# Patient Record
Sex: FEMALE | Race: WHITE | NOT HISPANIC OR LATINO
[De-identification: names, ages, dates, MRNs, and addresses within clinical notes are randomized per-mention and may not be internally consistent; named-entity substitution may affect disease eponyms.]

---

## 2017-03-18 ENCOUNTER — TRANSCRIPTION ENCOUNTER (OUTPATIENT)
Age: 72
End: 2017-03-18

## 2017-03-29 ENCOUNTER — TRANSCRIPTION ENCOUNTER (OUTPATIENT)
Age: 72
End: 2017-03-29

## 2017-08-13 ENCOUNTER — TRANSCRIPTION ENCOUNTER (OUTPATIENT)
Age: 72
End: 2017-08-13

## 2018-02-16 ENCOUNTER — TRANSCRIPTION ENCOUNTER (OUTPATIENT)
Age: 73
End: 2018-02-16

## 2022-07-08 ENCOUNTER — NON-APPOINTMENT (OUTPATIENT)
Age: 77
End: 2022-07-08

## 2022-07-12 ENCOUNTER — NON-APPOINTMENT (OUTPATIENT)
Age: 77
End: 2022-07-12

## 2022-07-19 ENCOUNTER — NON-APPOINTMENT (OUTPATIENT)
Age: 77
End: 2022-07-19

## 2022-09-19 ENCOUNTER — NON-APPOINTMENT (OUTPATIENT)
Age: 77
End: 2022-09-19

## 2022-11-29 ENCOUNTER — NON-APPOINTMENT (OUTPATIENT)
Age: 77
End: 2022-11-29

## 2022-12-30 ENCOUNTER — NON-APPOINTMENT (OUTPATIENT)
Age: 77
End: 2022-12-30

## 2023-06-19 ENCOUNTER — NON-APPOINTMENT (OUTPATIENT)
Age: 78
End: 2023-06-19

## 2023-08-07 ENCOUNTER — INPATIENT (INPATIENT)
Facility: HOSPITAL | Age: 78
LOS: 2 days | Discharge: HOME | DRG: 280 | End: 2023-08-10
Attending: STUDENT IN AN ORGANIZED HEALTH CARE EDUCATION/TRAINING PROGRAM | Admitting: HOSPITALIST
Payer: MEDICARE

## 2023-08-07 VITALS
HEART RATE: 64 BPM | SYSTOLIC BLOOD PRESSURE: 136 MMHG | OXYGEN SATURATION: 95 % | HEIGHT: 60 IN | WEIGHT: 125 LBS | DIASTOLIC BLOOD PRESSURE: 91 MMHG | RESPIRATION RATE: 18 BRPM

## 2023-08-07 DIAGNOSIS — Z96.649 PRESENCE OF UNSPECIFIED ARTIFICIAL HIP JOINT: Chronic | ICD-10-CM

## 2023-08-07 LAB
ANION GAP SERPL CALC-SCNC: 14 MMOL/L — SIGNIFICANT CHANGE UP (ref 5–17)
ANISOCYTOSIS BLD QL: SLIGHT — SIGNIFICANT CHANGE UP
APTT BLD: 32.1 SEC — SIGNIFICANT CHANGE UP (ref 24.5–35.6)
BASOPHILS # BLD AUTO: 0 K/UL — SIGNIFICANT CHANGE UP (ref 0–0.2)
BASOPHILS NFR BLD AUTO: 0 % — SIGNIFICANT CHANGE UP (ref 0–2)
BUN SERPL-MCNC: 16 MG/DL — SIGNIFICANT CHANGE UP (ref 7–23)
BURR CELLS BLD QL SMEAR: PRESENT — SIGNIFICANT CHANGE UP
CALCIUM SERPL-MCNC: 9.8 MG/DL — SIGNIFICANT CHANGE UP (ref 8.4–10.5)
CHLORIDE SERPL-SCNC: 104 MMOL/L — SIGNIFICANT CHANGE UP (ref 96–108)
CO2 SERPL-SCNC: 23 MMOL/L — SIGNIFICANT CHANGE UP (ref 22–31)
CREAT SERPL-MCNC: 0.72 MG/DL — SIGNIFICANT CHANGE UP (ref 0.5–1.3)
DACRYOCYTES BLD QL SMEAR: SLIGHT — SIGNIFICANT CHANGE UP
EGFR: 87 ML/MIN/1.73M2 — SIGNIFICANT CHANGE UP
ELLIPTOCYTES BLD QL SMEAR: SLIGHT — SIGNIFICANT CHANGE UP
EOSINOPHIL # BLD AUTO: 0.09 K/UL — SIGNIFICANT CHANGE UP (ref 0–0.5)
EOSINOPHIL NFR BLD AUTO: 0.9 % — SIGNIFICANT CHANGE UP (ref 0–6)
GIANT PLATELETS BLD QL SMEAR: PRESENT — SIGNIFICANT CHANGE UP
GLUCOSE SERPL-MCNC: 124 MG/DL — HIGH (ref 70–99)
HCT VFR BLD CALC: 44.3 % — SIGNIFICANT CHANGE UP (ref 34.5–45)
HGB BLD-MCNC: 14.4 G/DL — SIGNIFICANT CHANGE UP (ref 11.5–15.5)
INR BLD: 0.95 — SIGNIFICANT CHANGE UP (ref 0.85–1.18)
ISTAT ACTK (ACTIVATED CLOTTING TIME KAOLIN): 209 SEC — HIGH (ref 74–137)
LYMPHOCYTES # BLD AUTO: 4.53 K/UL — HIGH (ref 1–3.3)
LYMPHOCYTES # BLD AUTO: 44.5 % — HIGH (ref 13–44)
MANUAL SMEAR VERIFICATION: SIGNIFICANT CHANGE UP
MCHC RBC-ENTMCNC: 30.4 PG — SIGNIFICANT CHANGE UP (ref 27–34)
MCHC RBC-ENTMCNC: 32.5 GM/DL — SIGNIFICANT CHANGE UP (ref 32–36)
MCV RBC AUTO: 93.7 FL — SIGNIFICANT CHANGE UP (ref 80–100)
MICROCYTES BLD QL: SLIGHT — SIGNIFICANT CHANGE UP
MONOCYTES # BLD AUTO: 0.93 K/UL — HIGH (ref 0–0.9)
MONOCYTES NFR BLD AUTO: 9.1 % — SIGNIFICANT CHANGE UP (ref 2–14)
NEUTROPHILS # BLD AUTO: 4.54 K/UL — SIGNIFICANT CHANGE UP (ref 1.8–7.4)
NEUTROPHILS NFR BLD AUTO: 44.6 % — SIGNIFICANT CHANGE UP (ref 43–77)
NT-PROBNP SERPL-SCNC: 141 PG/ML — SIGNIFICANT CHANGE UP (ref 0–300)
OVALOCYTES BLD QL SMEAR: SLIGHT — SIGNIFICANT CHANGE UP
PLAT MORPH BLD: ABNORMAL
PLATELET # BLD AUTO: 322 K/UL — SIGNIFICANT CHANGE UP (ref 150–400)
POIKILOCYTOSIS BLD QL AUTO: SLIGHT — SIGNIFICANT CHANGE UP
POLYCHROMASIA BLD QL SMEAR: SLIGHT — SIGNIFICANT CHANGE UP
POTASSIUM SERPL-MCNC: 4.6 MMOL/L — SIGNIFICANT CHANGE UP (ref 3.5–5.3)
POTASSIUM SERPL-SCNC: 4.6 MMOL/L — SIGNIFICANT CHANGE UP (ref 3.5–5.3)
PROTHROM AB SERPL-ACNC: 10.9 SEC — SIGNIFICANT CHANGE UP (ref 9.5–13)
RBC # BLD: 4.73 M/UL — SIGNIFICANT CHANGE UP (ref 3.8–5.2)
RBC # FLD: 13.4 % — SIGNIFICANT CHANGE UP (ref 10.3–14.5)
RBC BLD AUTO: ABNORMAL
SMUDGE CELLS # BLD: PRESENT — SIGNIFICANT CHANGE UP
SODIUM SERPL-SCNC: 141 MMOL/L — SIGNIFICANT CHANGE UP (ref 135–145)
TROPONIN T, HIGH SENSITIVITY RESULT: 177 NG/L — CRITICAL HIGH (ref 0–51)
VARIANT LYMPHS # BLD: 0.9 % — SIGNIFICANT CHANGE UP (ref 0–6)
WBC # BLD: 10.19 K/UL — SIGNIFICANT CHANGE UP (ref 3.8–10.5)
WBC # FLD AUTO: 10.19 K/UL — SIGNIFICANT CHANGE UP (ref 3.8–10.5)

## 2023-08-07 PROCEDURE — 99285 EMERGENCY DEPT VISIT HI MDM: CPT

## 2023-08-07 PROCEDURE — 71045 X-RAY EXAM CHEST 1 VIEW: CPT | Mod: 26

## 2023-08-07 PROCEDURE — 99223 1ST HOSP IP/OBS HIGH 75: CPT

## 2023-08-07 PROCEDURE — 93458 L HRT ARTERY/VENTRICLE ANGIO: CPT | Mod: 26

## 2023-08-07 PROCEDURE — 99152 MOD SED SAME PHYS/QHP 5/>YRS: CPT

## 2023-08-07 RX ORDER — ONDANSETRON 8 MG/1
4 TABLET, FILM COATED ORAL EVERY 8 HOURS
Refills: 0 | Status: DISCONTINUED | OUTPATIENT
Start: 2023-08-07 | End: 2023-08-10

## 2023-08-07 RX ORDER — MORPHINE SULFATE 50 MG/1
4 CAPSULE, EXTENDED RELEASE ORAL ONCE
Refills: 0 | Status: DISCONTINUED | OUTPATIENT
Start: 2023-08-07 | End: 2023-08-07

## 2023-08-07 RX ORDER — LANOLIN ALCOHOL/MO/W.PET/CERES
3 CREAM (GRAM) TOPICAL AT BEDTIME
Refills: 0 | Status: DISCONTINUED | OUTPATIENT
Start: 2023-08-07 | End: 2023-08-10

## 2023-08-07 RX ORDER — HEPARIN SODIUM 5000 [USP'U]/ML
3500 INJECTION INTRAVENOUS; SUBCUTANEOUS EVERY 6 HOURS
Refills: 0 | Status: DISCONTINUED | OUTPATIENT
Start: 2023-08-07 | End: 2023-08-07

## 2023-08-07 RX ORDER — GABAPENTIN 400 MG/1
1 CAPSULE ORAL
Refills: 0 | DISCHARGE

## 2023-08-07 RX ORDER — NITROGLYCERIN 6.5 MG
20 CAPSULE, EXTENDED RELEASE ORAL
Qty: 50 | Refills: 0 | Status: DISCONTINUED | OUTPATIENT
Start: 2023-08-07 | End: 2023-08-07

## 2023-08-07 RX ORDER — CLOPIDOGREL BISULFATE 75 MG/1
600 TABLET, FILM COATED ORAL ONCE
Refills: 0 | Status: COMPLETED | OUTPATIENT
Start: 2023-08-07 | End: 2023-08-07

## 2023-08-07 RX ORDER — NITROGLYCERIN 6.5 MG
0.4 CAPSULE, EXTENDED RELEASE ORAL
Refills: 0 | Status: DISCONTINUED | OUTPATIENT
Start: 2023-08-07 | End: 2023-08-07

## 2023-08-07 RX ORDER — HEPARIN SODIUM 5000 [USP'U]/ML
INJECTION INTRAVENOUS; SUBCUTANEOUS
Qty: 25000 | Refills: 0 | Status: DISCONTINUED | OUTPATIENT
Start: 2023-08-07 | End: 2023-08-07

## 2023-08-07 RX ORDER — HEPARIN SODIUM 5000 [USP'U]/ML
3500 INJECTION INTRAVENOUS; SUBCUTANEOUS ONCE
Refills: 0 | Status: COMPLETED | OUTPATIENT
Start: 2023-08-07 | End: 2023-08-07

## 2023-08-07 RX ORDER — ACETAMINOPHEN 500 MG
650 TABLET ORAL EVERY 6 HOURS
Refills: 0 | Status: DISCONTINUED | OUTPATIENT
Start: 2023-08-07 | End: 2023-08-08

## 2023-08-07 RX ADMIN — CLOPIDOGREL BISULFATE 600 MILLIGRAM(S): 75 TABLET, FILM COATED ORAL at 19:57

## 2023-08-07 RX ADMIN — HEPARIN SODIUM 700 UNIT(S)/HR: 5000 INJECTION INTRAVENOUS; SUBCUTANEOUS at 19:57

## 2023-08-07 RX ADMIN — MORPHINE SULFATE 4 MILLIGRAM(S): 50 CAPSULE, EXTENDED RELEASE ORAL at 19:19

## 2023-08-07 RX ADMIN — Medication 0.4 MILLIGRAM(S): at 18:49

## 2023-08-07 RX ADMIN — MORPHINE SULFATE 4 MILLIGRAM(S): 50 CAPSULE, EXTENDED RELEASE ORAL at 22:15

## 2023-08-07 RX ADMIN — HEPARIN SODIUM 3500 UNIT(S): 5000 INJECTION INTRAVENOUS; SUBCUTANEOUS at 19:57

## 2023-08-07 RX ADMIN — Medication 6 MICROGRAM(S)/MIN: at 20:13

## 2023-08-07 RX ADMIN — MORPHINE SULFATE 4 MILLIGRAM(S): 50 CAPSULE, EXTENDED RELEASE ORAL at 21:59

## 2023-08-07 RX ADMIN — MORPHINE SULFATE 4 MILLIGRAM(S): 50 CAPSULE, EXTENDED RELEASE ORAL at 19:35

## 2023-08-07 NOTE — ED ADULT NURSE NOTE - NSFALLHARMRISKINTERV_ED_ALL_ED

## 2023-08-07 NOTE — ED ADULT NURSE REASSESSMENT NOTE - NS ED NURSE REASSESS COMMENT FT1
cardiology at bedside. pt continues to c/o "crushing" CP and pain to upper back and neck. pt pending repeat EKG.

## 2023-08-07 NOTE — ED ADULT NURSE NOTE - OBJECTIVE STATEMENT
78F no known prior medical hx presents c/o sudden onset of severe "squeezing" substernal CP radiating to her neck, arms and mid back that started around 530pm. pt reports pain came on at rest. pt received 324 ASA and 1 spray of nitro en route. pt seen as UPGRADE with Dr. Mitchell at bedside. EKG completed and patient placed on continuous cardiac monitor. denies SOB/palpitations, n/v/d, fevers/chills, cough/congestion or weakness/dizziness. pt speaking in clear, complete sentences. RR easy, even, un-labored on room air. 18g peripheral IV placed to left forearm.

## 2023-08-07 NOTE — ED PROVIDER NOTE - PHYSICAL EXAMINATION
CONST: nontoxic NAD uncomfortable 2/2 pain  HEAD: atraumatic  EYES: conjunctivae clear, EOMI  ENT: mmm  NECK: supple  CARD: rrr   CHEST: ctab no r/r/w, no stridor/retractions/tripoding  ABD: soft, nd, nttp, no rebound/guarding  EXT: FROM, symmetric distal pulses intact  SKIN: warm, dry, no rash, no pedal edema  NEURO: awake alert answering questions following commands moving all extremities

## 2023-08-07 NOTE — ED PROVIDER NOTE - CLINICAL SUMMARY MEDICAL DECISION MAKING FREE TEXT BOX
In the ED patient with normal vitals, uncomfortable appearing, complaining of active chest pain.  Hx consistent w/ most likely ACS, low suspicion for PTX. Not consistent w/ PE or AD.  EKG done immediately-no STEMI, however had does have ST depressions in ii, iii, avf.  Called cardiology fellow to come evaluate patient for unstable angina with active chest pain and EKG changes.  Patient was evaluated in the ED, received sublingual nitro and morphine with some improvement but not complete resolution of symptoms.  Troponin 177, cardiology fellow recommends Plavix, heparin, admission for cardiac catheterization In the ED patient with normal vitals, uncomfortable appearing, complaining of active chest pain.  Hx consistent w/ most likely ACS, low suspicion for PTX. Not consistent w/ PE or AD.  EKG done immediately-no STEMI, however had does have ST depressions in ii, iii, avf.  Called cardiology fellow to come evaluate patient for unstable angina with active chest pain and EKG changes.  Patient was evaluated in the ED, received sublingual nitro and morphine with some improvement but not complete resolution of symptoms.  Troponin 177, cardiology fellow recommends Plavix, heparin, admission for urgent cardiac catheterization In the ED patient with normal vitals, uncomfortable appearing, complaining of active chest pain.  Hx consistent w/ most likely ACS, low suspicion for PTX. Not consistent w/ PE or AD.  EKG done immediately-no STEMI, however does have ST depressions in ii, iii, avf.  Called cardiology fellow to come evaluate patient for unstable angina with active chest pain and EKG changes.  Patient was evaluated in the ED, received sublingual nitro and morphine with some improvement but not complete resolution of symptoms.  Troponin 177, cardiology fellow recommends Plavix, heparin, admission for urgent cardiac catheterization
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
## 2023-08-07 NOTE — PATIENT PROFILE ADULT - BILL PAYMENT - CHOOSE ALL APPLY
[facility] EMERGENCY DEPARTMENT  [address]  [place] [address]  Phone: [phone]    August 29, 2017        Ashleigh Linares  [address]  [facility] [address]          To whom it may concern:    RE: Ashleigh Sotelo was evaluated in the emergency department with fever. Please excuse mother from this evening's requirements.      Sincerely,        Roseanna Ulloa MD   electricity/gas/medical bills/phone/cell phone

## 2023-08-07 NOTE — H&P ADULT - NSHPPHYSICALEXAM_GEN_ALL_CORE
.  VITAL SIGNS:  T(C): 36.6 (08-07-23 @ 19:11), Max: 36.6 (08-07-23 @ 19:11)  T(F): 97.8 (08-07-23 @ 19:11), Max: 97.8 (08-07-23 @ 19:11)  HR: 69 (08-07-23 @ 20:00) (62 - 72)  BP: 155/105 (08-07-23 @ 20:00) (136/91 - 167/98)  BP(mean): --  RR: 18 (08-07-23 @ 20:00) (17 - 18)  SpO2: 98% (08-07-23 @ 20:00) (91% - 98%)  Wt(kg): --    PHYSICAL EXAM:    Constitutional: nontoxic NAD uncomfortable 2/2 pain  Head: NC/AT  Eyes: PERRL, EOMI, anicteric sclera  ENT: no nasal discharge; uvula midline, no oropharyngeal erythema or exudates; MMM  Neck: supple; no JVD or thyromegaly  Respiratory: CTA B/L; no W/R/R, no retractions  Cardiac: +S1/S2; RRR; no M/R/G; PMI non-displaced  Gastrointestinal: abdomen soft, NT/ND; no rebound or guarding; +BSx4  Extremities: WWP, no clubbing or cyanosis; no peripheral edema  Vascular: bandage overlying right fem artery-nonfluctuant, non tender, +1DP/PT pulses B/L  Neurologic: AAOx3; CNII-XII grossly intact; no focal deficits .  VITAL SIGNS:  T(C): 36.6 (08-07-23 @ 19:11), Max: 36.6 (08-07-23 @ 19:11)  T(F): 97.8 (08-07-23 @ 19:11), Max: 97.8 (08-07-23 @ 19:11)  HR: 69 (08-07-23 @ 20:00) (62 - 72)  BP: 155/105 (08-07-23 @ 20:00) (136/91 - 167/98)  BP(mean): --  RR: 18 (08-07-23 @ 20:00) (17 - 18)  SpO2: 98% (08-07-23 @ 20:00) (91% - 98%)  Wt(kg): --    PHYSICAL EXAM:    Constitutional: nontoxic, mild distress 2/2 pain  Head: NC/AT  Eyes: PERRL, EOMI, anicteric sclera  ENT: no nasal discharge; uvula midline, no oropharyngeal erythema or exudates; MMM  Neck: supple; no JVD or thyromegaly  Respiratory: CTA B/L; no W/R/R, no retractions  Cardiac: +S1/S2; RRR; no M/R/G; PMI non-displaced  Gastrointestinal: abdomen soft, NT/ND; no rebound or guarding; +BSx4  Extremities: WWP, no clubbing or cyanosis; no peripheral edema  Vascular: bandage overlying right fem artery-nonfluctuant, non tender, +1DP/PT pulses B/L  Neurologic: AAOx3; CNII-XII grossly intact; no focal deficits

## 2023-08-07 NOTE — H&P ADULT - NSHPLABSRESULTS_GEN_ALL_CORE
14.4   10.19 )-----------( 322      ( 07 Aug 2023 18:40 )             44.3       08-07    141  |  104  |  16  ----------------------------<  124<H>  4.6   |  23  |  0.72    Ca    9.8      07 Aug 2023 18:40                Urinalysis Basic - ( 07 Aug 2023 18:40 )    Color: x / Appearance: x / SG: x / pH: x  Gluc: 124 mg/dL / Ketone: x  / Bili: x / Urobili: x   Blood: x / Protein: x / Nitrite: x   Leuk Esterase: x / RBC: x / WBC x   Sq Epi: x / Non Sq Epi: x / Bacteria: x        PT/INR - ( 07 Aug 2023 18:40 )   PT: 10.9 sec;   INR: 0.95          PTT - ( 07 Aug 2023 18:40 )  PTT:32.1 sec    Lactate Trend            CAPILLARY BLOOD GLUCOSE

## 2023-08-07 NOTE — PATIENT PROFILE ADULT - FALL HARM RISK - HARM RISK INTERVENTIONS

## 2023-08-07 NOTE — ED PROVIDER NOTE - OBJECTIVE STATEMENT
78-year-old female with no significant comorbidities, no cardiac history however has not had a cardiac workup recently, non-smoker, comes in for severe squeezing-like substernal chest pain radiating up to the neck arm and back that started at 5:30 PM while at rest.  States she never gets chest pain like this, does not typically get chest pain on exertion.  Per EMS, EKG with ST depressions.  Patient got aspirin 324 and 1 sublingual nitro.

## 2023-08-07 NOTE — H&P ADULT - HISTORY OF PRESENT ILLNESS
78 yr old F with no significant PMH who presents to [facility] ED 8/7/23 with episode of sudden onset of nonexertional SSCP with radiation to neck/LUE and back, 7/10 in severity. Patient denies any associated N/V, diaphoresis, palpitations, dizziness, PND, orthopnea, LE edema, recent travel, sick contacts or recent cardiac work-up. Patient was given ASA 324mg PO x 1 dose and NTG spray by EMS enroute to ED.     In ED, BP: 136/91, HR: 60s, RR:18, Temp: 98F, O2 sat: 95% RA. EKG revealed NSR@62BPM with ST depressions in Leads II, III, AVF. CXR was unremarkable.   Labs notable for: Trop T 177, . Patient R/I NSTEMI, given SL NTG 0.4mg x 1 dose, Morphine 4mg IV x 1 dose, loaded with Plavix 600mg PO x 1 dose and started on a Heparin gtt per ACS protocol.     Interval history:   Patient currently stable, S/P cardiac catheterization (8/7) and admitted to cardiac telemetry post procedure for monitoring. She is still complaining of chest pain, rating it 7/10-Morphine 4mg IV given again.  78 yr old F with PMH of Depression, severe insomnia and chronic back pain, presents to [facility] ED 8/7/23 with episode of sudden onset of nonexertional SSCP with radiation to neck/LUE and back, 7/10 in severity. Patient denies any associated N/V, diaphoresis, palpitations, dizziness, PND, orthopnea, LE edema, recent travel, sick contacts or recent cardiac work-up. Patient was given ASA 324mg PO x 1 dose and NTG spray by EMS enroute to ED.     In ED, BP: 136/91, HR: 60s, RR:18, Temp: 98F, O2 sat: 95% RA. EKG revealed NSR@62BPM with ST depressions in Leads II, III, AVF. CXR was unremarkable.   Labs notable for: Trop T 177, . Patient R/I NSTEMI, given SL NTG 0.4mg x 1 dose, Morphine 4mg IV x 1 dose, loaded with Plavix 600mg PO x 1 dose and started on a Heparin gtt per ACS protocol.     Interval history:   Patient currently stable, S/P cardiac catheterization (8/7) and admitted to cardiac telemetry post procedure for monitoring. She is still complaining of chest pain, rating it 7/10-Morphine 4mg IV given again.  78 yr old F with PMH of Depression, severe insomnia and chronic back pain, presents to [facility] ED 8/7/23 with episode of sudden onset of nonexertional SSCP with radiation to neck/LUE and back, 7/10 in severity. Patient denies any associated N/V, diaphoresis, palpitations, dizziness, PND, orthopnea, LE edema, recent travel, sick contacts or recent cardiac work-up. Patient was given ASA 324mg PO x 1 dose and NTG spray by EMS enroute to ED.     In ED, BP: 136/91, HR: 60s, RR:18, Temp: 98F, O2 sat: 95% RA. EKG revealed NSR@62BPM with ST depressions in Leads II, III, AVF. CXR was unremarkable.   Labs notable for: Trop T 177, . Patient R/I NSTEMI, given SL NTG 0.4mg x 1 dose, Morphine 4mg IV x 1 dose, loaded with Plavix 600mg PO x 1 dose and started on a Heparin gtt per ACS protocol.     Interval history:   Patient currently stable, S/P cardiac catheterization (8/7) and admitted to cardiac telemetry post procedure for monitoring. She is still complaining of chest pain, rating it 7/10-Morphine 4mg IV given again. Bedside echo showed anterior and lateral wall motion abnormalities. Moderately reduced EF.  77yo F with PMHx of Depression, severe insomnia, and chronic back pain, presents to [facility] ED 8/7/23 with episode of sudden onset nonexertional substernal chest pain w/ radiation to neck/LUE and back, 7/10 in severity. Patient denies any associated N/V, diaphoresis, palpitations, dizziness, PND, orthopnea, LE edema, recent travel, sick contacts or recent cardiac work-up. Patient was given ASA 324mg PO x 1 dose and NTG spray by EMS enroute to ED.     In ED, BP: 136/91, HR: 60s, RR:18, Temp: 98F, O2 sat: 95% RA. EKG revealed NSR@62BPM with ST depressions in Leads II, III, AVF. CXR was unremarkable.   Labs notable for: Trop T 177, . Patient R/I NSTEMI, given SL NTG 0.4mg x 1 dose, Morphine 4mg IV x 1 dose, loaded with Plavix 600mg PO x 1 dose and started on a Heparin gtt per ACS protocol.     Interval history:   Patient currently stable, S/P cardiac catheterization (8/7) and admitted to cardiac telemetry post procedure for monitoring. She is still complaining of chest pain, rating it 7/10-Morphine 4mg IV given again. Bedside echo showed anterior and lateral wall motion abnormalities. Moderately reduced EF.  79yo F with PMHx of Depression, severe insomnia, and chronic back pain, presents to [facility] ED 8/7/23 with episode of sudden onset nonexertional substernal chest pain w/ radiation to neck/LUE and back, 7/10 in severity. Patient denies any associated N/V, diaphoresis, palpitations, dizziness, PND, orthopnea, LE edema, recent travel, sick contacts or recent cardiac work-up. Patient was given ASA 324mg PO x 1 dose and nitroglycerin spray by EMS enroute to ED.     ED Course:   Vitals:  BP: 136/91, HR: 60s, RR:18, Temp: 98F, O2 sat: 95% RA.   EKG revealed NSR@62BPM with ST depressions in Leads II, III, AVF c/f NSTEMI  CXR was unremarkable.   Bedside Echo: anterior and lateral wall motion abnormalities. Moderately reduced EF.   Labs: Trop T 177,   Medications: Nitroglycerin 0.4mg x 1 dose, Morphine 4mg IV x 1 dose, loaded with Plavix 600mg PO x 1 dose and started on a Heparin gtt    Interval history:   Pt arrived to CCU s/p cardiac catheterization (8/7), admitted for post-procedure for monitoring. Vitals stable, pt c/o continued 7/10 chest pain, received Morphine 4mg IVP x1 w/ modest improvement.

## 2023-08-08 DIAGNOSIS — F41.9 ANXIETY DISORDER, UNSPECIFIED: ICD-10-CM

## 2023-08-08 LAB
A1C WITH ESTIMATED AVERAGE GLUCOSE RESULT: 4.9 % — SIGNIFICANT CHANGE UP (ref 4–5.6)
ALBUMIN SERPL ELPH-MCNC: 4 G/DL — SIGNIFICANT CHANGE UP (ref 3.3–5)
ALP SERPL-CCNC: 74 U/L — SIGNIFICANT CHANGE UP (ref 40–120)
ALT FLD-CCNC: 17 U/L — SIGNIFICANT CHANGE UP (ref 10–45)
ANION GAP SERPL CALC-SCNC: 13 MMOL/L — SIGNIFICANT CHANGE UP (ref 5–17)
AST SERPL-CCNC: 36 U/L — SIGNIFICANT CHANGE UP (ref 10–40)
BILIRUB SERPL-MCNC: 0.3 MG/DL — SIGNIFICANT CHANGE UP (ref 0.2–1.2)
BUN SERPL-MCNC: 14 MG/DL — SIGNIFICANT CHANGE UP (ref 7–23)
CALCIUM SERPL-MCNC: 9.1 MG/DL — SIGNIFICANT CHANGE UP (ref 8.4–10.5)
CHLORIDE SERPL-SCNC: 99 MMOL/L — SIGNIFICANT CHANGE UP (ref 96–108)
CHOLEST SERPL-MCNC: 189 MG/DL — SIGNIFICANT CHANGE UP
CK MB CFR SERPL CALC: 23.5 NG/ML — HIGH (ref 0–6.7)
CK SERPL-CCNC: 152 U/L — SIGNIFICANT CHANGE UP (ref 25–170)
CO2 SERPL-SCNC: 24 MMOL/L — SIGNIFICANT CHANGE UP (ref 22–31)
CREAT SERPL-MCNC: 0.71 MG/DL — SIGNIFICANT CHANGE UP (ref 0.5–1.3)
EGFR: 88 ML/MIN/1.73M2 — SIGNIFICANT CHANGE UP
ESTIMATED AVERAGE GLUCOSE: 94 MG/DL — SIGNIFICANT CHANGE UP (ref 68–114)
GLUCOSE SERPL-MCNC: 106 MG/DL — HIGH (ref 70–99)
HCT VFR BLD CALC: 42.2 % — SIGNIFICANT CHANGE UP (ref 34.5–45)
HCV AB S/CO SERPL IA: 0.04 S/CO — SIGNIFICANT CHANGE UP
HCV AB SERPL-IMP: SIGNIFICANT CHANGE UP
HDLC SERPL-MCNC: 48 MG/DL — LOW
HGB BLD-MCNC: 14.1 G/DL — SIGNIFICANT CHANGE UP (ref 11.5–15.5)
LACTATE SERPL-SCNC: 1.1 MMOL/L — SIGNIFICANT CHANGE UP (ref 0.5–2)
LIPID PNL WITH DIRECT LDL SERPL: 121 MG/DL — HIGH
MAGNESIUM SERPL-MCNC: 1.8 MG/DL — SIGNIFICANT CHANGE UP (ref 1.6–2.6)
MCHC RBC-ENTMCNC: 30.9 PG — SIGNIFICANT CHANGE UP (ref 27–34)
MCHC RBC-ENTMCNC: 33.4 GM/DL — SIGNIFICANT CHANGE UP (ref 32–36)
MCV RBC AUTO: 92.5 FL — SIGNIFICANT CHANGE UP (ref 80–100)
NON HDL CHOLESTEROL: 141 MG/DL — HIGH
NRBC # BLD: 0 /100 WBCS — SIGNIFICANT CHANGE UP (ref 0–0)
PHOSPHATE SERPL-MCNC: 3.9 MG/DL — SIGNIFICANT CHANGE UP (ref 2.5–4.5)
PLATELET # BLD AUTO: 300 K/UL — SIGNIFICANT CHANGE UP (ref 150–400)
POTASSIUM SERPL-MCNC: 3.5 MMOL/L — SIGNIFICANT CHANGE UP (ref 3.5–5.3)
POTASSIUM SERPL-SCNC: 3.5 MMOL/L — SIGNIFICANT CHANGE UP (ref 3.5–5.3)
PROT SERPL-MCNC: 7.3 G/DL — SIGNIFICANT CHANGE UP (ref 6–8.3)
RBC # BLD: 4.56 M/UL — SIGNIFICANT CHANGE UP (ref 3.8–5.2)
RBC # FLD: 13.3 % — SIGNIFICANT CHANGE UP (ref 10.3–14.5)
SODIUM SERPL-SCNC: 136 MMOL/L — SIGNIFICANT CHANGE UP (ref 135–145)
TRIGL SERPL-MCNC: 100 MG/DL — SIGNIFICANT CHANGE UP
TROPONIN T, HIGH SENSITIVITY RESULT: 684 NG/L — CRITICAL HIGH (ref 0–51)
TSH SERPL-MCNC: 2.14 UIU/ML — SIGNIFICANT CHANGE UP (ref 0.27–4.2)
WBC # BLD: 8.39 K/UL — SIGNIFICANT CHANGE UP (ref 3.8–10.5)
WBC # FLD AUTO: 8.39 K/UL — SIGNIFICANT CHANGE UP (ref 3.8–10.5)

## 2023-08-08 PROCEDURE — 71045 X-RAY EXAM CHEST 1 VIEW: CPT | Mod: 26

## 2023-08-08 PROCEDURE — 93306 TTE W/DOPPLER COMPLETE: CPT | Mod: 26

## 2023-08-08 PROCEDURE — 99222 1ST HOSP IP/OBS MODERATE 55: CPT

## 2023-08-08 PROCEDURE — 99291 CRITICAL CARE FIRST HOUR: CPT

## 2023-08-08 RX ORDER — CLONAZEPAM 1 MG
0.5 TABLET ORAL
Refills: 0 | Status: DISCONTINUED | OUTPATIENT
Start: 2023-08-08 | End: 2023-08-08

## 2023-08-08 RX ORDER — GABAPENTIN 400 MG/1
300 CAPSULE ORAL ONCE
Refills: 0 | Status: COMPLETED | OUTPATIENT
Start: 2023-08-08 | End: 2023-08-08

## 2023-08-08 RX ORDER — OXYCODONE AND ACETAMINOPHEN 5; 325 MG/1; MG/1
1 TABLET ORAL EVERY 8 HOURS
Refills: 0 | Status: DISCONTINUED | OUTPATIENT
Start: 2023-08-08 | End: 2023-08-08

## 2023-08-08 RX ORDER — MAGNESIUM SULFATE 500 MG/ML
2 VIAL (ML) INJECTION ONCE
Refills: 0 | Status: COMPLETED | OUTPATIENT
Start: 2023-08-08 | End: 2023-08-08

## 2023-08-08 RX ORDER — TRAZODONE HCL 50 MG
300 TABLET ORAL AT BEDTIME
Refills: 0 | Status: DISCONTINUED | OUTPATIENT
Start: 2023-08-07 | End: 2023-08-08

## 2023-08-08 RX ORDER — GABAPENTIN 400 MG/1
300 CAPSULE ORAL AT BEDTIME
Refills: 0 | Status: DISCONTINUED | OUTPATIENT
Start: 2023-08-07 | End: 2023-08-10

## 2023-08-08 RX ORDER — GABAPENTIN 400 MG/1
1 CAPSULE ORAL
Refills: 0 | DISCHARGE

## 2023-08-08 RX ORDER — BUPROPION HYDROCHLORIDE 150 MG/1
150 TABLET, EXTENDED RELEASE ORAL DAILY
Refills: 0 | Status: DISCONTINUED | OUTPATIENT
Start: 2023-08-08 | End: 2023-08-08

## 2023-08-08 RX ORDER — TRAZODONE HCL 50 MG
300 TABLET ORAL ONCE
Refills: 0 | Status: COMPLETED | OUTPATIENT
Start: 2023-08-08 | End: 2023-08-08

## 2023-08-08 RX ORDER — TRAZODONE HCL 50 MG
50 TABLET ORAL ONCE
Refills: 0 | Status: COMPLETED | OUTPATIENT
Start: 2023-08-08 | End: 2023-08-08

## 2023-08-08 RX ORDER — ACETAMINOPHEN 500 MG
650 TABLET ORAL EVERY 6 HOURS
Refills: 0 | Status: DISCONTINUED | OUTPATIENT
Start: 2023-08-08 | End: 2023-08-10

## 2023-08-08 RX ORDER — ENOXAPARIN SODIUM 100 MG/ML
40 INJECTION SUBCUTANEOUS EVERY 24 HOURS
Refills: 0 | Status: DISCONTINUED | OUTPATIENT
Start: 2023-08-08 | End: 2023-08-10

## 2023-08-08 RX ORDER — CLONAZEPAM 1 MG
0.5 TABLET ORAL
Refills: 0 | Status: DISCONTINUED | OUTPATIENT
Start: 2023-08-07 | End: 2023-08-08

## 2023-08-08 RX ORDER — OXYCODONE AND ACETAMINOPHEN 5; 325 MG/1; MG/1
1 TABLET ORAL
Refills: 0 | DISCHARGE

## 2023-08-08 RX ORDER — DICLOFENAC SODIUM 75 MG/1
50 TABLET, DELAYED RELEASE ORAL THREE TIMES A DAY
Refills: 0 | Status: DISCONTINUED | OUTPATIENT
Start: 2023-08-08 | End: 2023-08-08

## 2023-08-08 RX ORDER — BUPROPION HYDROCHLORIDE 150 MG/1
150 TABLET, EXTENDED RELEASE ORAL DAILY
Refills: 0 | Status: DISCONTINUED | OUTPATIENT
Start: 2023-08-09 | End: 2023-08-10

## 2023-08-08 RX ORDER — ACETAMINOPHEN 500 MG
650 TABLET ORAL ONCE
Refills: 0 | Status: COMPLETED | OUTPATIENT
Start: 2023-08-08 | End: 2023-08-08

## 2023-08-08 RX ORDER — TRAMADOL HYDROCHLORIDE 50 MG/1
25 TABLET ORAL EVERY 8 HOURS
Refills: 0 | Status: DISCONTINUED | OUTPATIENT
Start: 2023-08-08 | End: 2023-08-08

## 2023-08-08 RX ORDER — OMEPRAZOLE 10 MG/1
1 CAPSULE, DELAYED RELEASE ORAL
Refills: 0 | DISCHARGE

## 2023-08-08 RX ORDER — CLONAZEPAM 1 MG
0.5 TABLET ORAL ONCE
Refills: 0 | Status: DISCONTINUED | OUTPATIENT
Start: 2023-08-08 | End: 2023-08-08

## 2023-08-08 RX ORDER — MORPHINE SULFATE 50 MG/1
2 CAPSULE, EXTENDED RELEASE ORAL EVERY 4 HOURS
Refills: 0 | Status: DISCONTINUED | OUTPATIENT
Start: 2023-08-08 | End: 2023-08-08

## 2023-08-08 RX ORDER — BUPROPION HYDROCHLORIDE 150 MG/1
100 TABLET, EXTENDED RELEASE ORAL DAILY
Refills: 0 | Status: DISCONTINUED | OUTPATIENT
Start: 2023-08-08 | End: 2023-08-08

## 2023-08-08 RX ORDER — HYDROXYZINE HCL 10 MG
1 TABLET ORAL
Refills: 0 | DISCHARGE

## 2023-08-08 RX ORDER — QUETIAPINE FUMARATE 200 MG/1
2 TABLET, FILM COATED ORAL
Refills: 0 | DISCHARGE

## 2023-08-08 RX ORDER — COLESEVELAM HYDROCHLORIDE 625 MG/1
1 TABLET, FILM COATED ORAL
Refills: 0 | DISCHARGE

## 2023-08-08 RX ORDER — POTASSIUM CHLORIDE 20 MEQ
40 PACKET (EA) ORAL ONCE
Refills: 0 | Status: COMPLETED | OUTPATIENT
Start: 2023-08-08 | End: 2023-08-08

## 2023-08-08 RX ADMIN — Medication 0.5 MILLIGRAM(S): at 01:29

## 2023-08-08 RX ADMIN — DICLOFENAC SODIUM 50 MILLIGRAM(S): 75 TABLET, DELAYED RELEASE ORAL at 02:30

## 2023-08-08 RX ADMIN — Medication 650 MILLIGRAM(S): at 21:59

## 2023-08-08 RX ADMIN — ENOXAPARIN SODIUM 40 MILLIGRAM(S): 100 INJECTION SUBCUTANEOUS at 08:11

## 2023-08-08 RX ADMIN — Medication 300 MILLIGRAM(S): at 01:58

## 2023-08-08 RX ADMIN — Medication 0.5 MILLIGRAM(S): at 01:15

## 2023-08-08 RX ADMIN — Medication 650 MILLIGRAM(S): at 17:18

## 2023-08-08 RX ADMIN — Medication 650 MILLIGRAM(S): at 11:09

## 2023-08-08 RX ADMIN — MORPHINE SULFATE 2 MILLIGRAM(S): 50 CAPSULE, EXTENDED RELEASE ORAL at 01:29

## 2023-08-08 RX ADMIN — BUPROPION HYDROCHLORIDE 150 MILLIGRAM(S): 150 TABLET, EXTENDED RELEASE ORAL at 12:34

## 2023-08-08 RX ADMIN — GABAPENTIN 300 MILLIGRAM(S): 400 CAPSULE ORAL at 21:59

## 2023-08-08 RX ADMIN — GABAPENTIN 300 MILLIGRAM(S): 400 CAPSULE ORAL at 01:57

## 2023-08-08 RX ADMIN — BUPROPION HYDROCHLORIDE 150 MILLIGRAM(S): 150 TABLET, EXTENDED RELEASE ORAL at 11:09

## 2023-08-08 RX ADMIN — DICLOFENAC SODIUM 50 MILLIGRAM(S): 75 TABLET, DELAYED RELEASE ORAL at 01:57

## 2023-08-08 RX ADMIN — Medication 25 GRAM(S): at 08:11

## 2023-08-08 RX ADMIN — MORPHINE SULFATE 2 MILLIGRAM(S): 50 CAPSULE, EXTENDED RELEASE ORAL at 01:59

## 2023-08-08 RX ADMIN — Medication 40 MILLIEQUIVALENT(S): at 08:11

## 2023-08-08 RX ADMIN — Medication 650 MILLIGRAM(S): at 16:22

## 2023-08-08 RX ADMIN — Medication 50 MILLIGRAM(S): at 22:36

## 2023-08-08 NOTE — BH CONSULTATION LIAISON ASSESSMENT NOTE - HPI (INCLUDE ILLNESS QUALITY, SEVERITY, DURATION, TIMING, CONTEXT, MODIFYING FACTORS, ASSOCIATED SIGNS AND SYMPTOMS)
Currently ordered meds:  bupropion XL 150mg daily  gabapentin 300mg qhs  trazodone 300mg qhs    at home on clonazepam 0.5mg bid; being held for now given low BPs    [employer] Reference #: [id number]    PDI	My Rx	Current Rx	Drug Type	Rx Written	Rx Dispensed	Drug	Quantity	Days Supply	Prescriber Name	Prescriber MIMI #	Payment Method	Dispenser  A	N	N	B	11/11/2022	11/11/2022	clonazepam 0.5 mg tablet	60	30	Alvin Quintero H MD	[id number]	Medicare	[facility]  A	N	N	O	11/07/2022	11/07/2022	oxycodone-acetaminophen  mg tab	120	30	Lopez Johnson	[id number]	Medicare	[facility]  A	N	N	O	10/10/2022	10/14/2022	oxycodone-acetaminophen  mg tab	120	30	Lopez Johnson	[id number]	Medicare	[facility]  A	N	N	B	09/27/2022	09/27/2022	clonazepam 0.5 mg tablet	60	30	Alvin Quintero H MD	[id number]	Medicare	[facility]  A	N	N	O	08/31/2022	08/31/2022	oxycodone-acetaminophen  mg tab	120	30	Lopez Johnson	[id number]	Medicare	[facility]  A	N	N	B	08/30/2022	08/30/2022	clonazepam 0.5 mg tablet	60	30	Alvin Quintero H MD	[id number]	Medicare	[facility]
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
## 2023-08-08 NOTE — BH CONSULTATION LIAISON ASSESSMENT NOTE - NSACTIVEVENT_PSY_ALL_CORE
Triggering events leading to humiliation, shame, and/or despair (e.g., Loss of relationship, financial or health status) (real or anticipated)/Current or pending social isolation/Chronic pain or other acute medical condition/Substance intoxication or withdrawal Triggering events leading to humiliation, shame, and/or despair (e.g., Loss of relationship, financial or health status) (real or anticipated)/Current or pending social isolation/Chronic pain or other acute medical condition

## 2023-08-08 NOTE — BH CONSULTATION LIAISON ASSESSMENT NOTE - NSBHCONSULTFOLLOWAFTERCARE_PSY_A_CORE FT
[facility]  o	Telehealth visits  o	[address], [place] [address]  o	[phone]  o	Medicare, Medicaid, most private insurances    [facility]  o	[email or web address]  o	[address]th [place] [address]   [phone]  o	Medicare, Medicaid, most private insurances    [facility]  o	Walk in services, Monday – Friday: 7:30am – 1pm   o	[address]   [place] [address]  o	[phone]  o	Medicare, Medicaid, and all private insurances    [facility] - *Virtual since Covid – Call first*  o	[address]   [facility] building, Ground Floor,  [address]  [place] [address]  o	[phone]    [facility]  o	[email or web address]  o	Three locations  ?	[address] [place] [address]  Phone: [phone]  ?	[address] [facility] [address] [place] [address]  Phone: [phone]  ?	[address] [facility] [address] [place] [address]  Phone: [phone]  o	Medicare, Medicaid, most private insurance and [facility]  o	[email or web address]  o	[address] [place] [address]  [phone]  Intake hours for new patients: Tuesdays and Thursdays at 8am  o	Medicare, Medicaid, most private insurance  [facility]  o	[email or web address]  o	[address] [place]  [address]  [phone]  o	Medicare, Medicaid, AARP, Aetna, [employer], Amer[employer], [employer], [employer], [employer], [employer], BCBS, [place], [employer], HealthFirst, H[employer], [employer], [place], [employer], [employer]

## 2023-08-08 NOTE — BH CONSULTATION LIAISON ASSESSMENT NOTE - LEGAL HISTORY
States there is a distant history of being arrested for a crime she did not commit with records being deleted and no evidence of it having ever happened
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
## 2023-08-08 NOTE — BH CONSULTATION LIAISON ASSESSMENT NOTE - ADDITIONAL DETAILS ALL
Specifically references taking 13 pills of phenobarbital at age 14 in an attempt to kill herself.   Is unable to name any other attempts and instead states protective factors calling this attempt "childish"

## 2023-08-08 NOTE — BH CONSULTATION LIAISON ASSESSMENT NOTE - NSBHSAALC_PSY_A_CORE FT
States that she only drinks socially and its one glass of wine  States that she only drinks socially and its one glass of wine when she goes out.

## 2023-08-08 NOTE — PROVIDER CONTACT NOTE (OTHER) - SITUATION
Dr. Carl notified of hypotension from 0300 to 0630, Dr. Coppola notified of hypotension from 9260-1956. Patient asymptomatic.
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
## 2023-08-08 NOTE — BH CONSULTATION LIAISON ASSESSMENT NOTE - NSSUICPROTFACT_PSY_ALL_CORE
Responsibility to children, family, or others/Identifies reasons for living/Supportive social network of family or friends/Cultural, spiritual and/or moral attitudes against suicide/Beloved pets
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
## 2023-08-08 NOTE — PROGRESS NOTE ADULT - PROBLEM SELECTOR PLAN 5
Heme/onc:  H/H 14.1/42.2 and stable.  PLAN:  - Transfuse if Hgb <7       F: none  E: Mag >2, K >4  Diet: Regular  DVT PPX: Lovenox 40  Dispo: CCU
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
## 2023-08-08 NOTE — BH CONSULTATION LIAISON ASSESSMENT NOTE - NSBHCHARTREVIEWVS_PSY_A_CORE FT
Vital Signs Last 24 Hrs  T(C): 37 (08 Aug 2023 09:00), Max: 37.1 (07 Aug 2023 21:55)  T(F): 98.6 (08 Aug 2023 09:00), Max: 98.8 (07 Aug 2023 21:55)  HR: 75 (08 Aug 2023 11:00) (58 - 82)  BP: 81/58 (08 Aug 2023 11:00) (79/56 - 167/98)  BP(mean): 65 (08 Aug 2023 11:00) (62 - 112)  RR: 10 (08 Aug 2023 11:00) (10 - 20)  SpO2: 92% (08 Aug 2023 11:00) (91% - 99%)    Parameters below as of 08 Aug 2023 11:00  Patient On (Oxygen Delivery Method): room air    
Vital Signs Last 24 Hrs  T(C): 37 (08 Aug 2023 09:00), Max: 37.1 (07 Aug 2023 21:55)  T(F): 98.6 (08 Aug 2023 09:00), Max: 98.8 (07 Aug 2023 21:55)  HR: 84 (08 Aug 2023 13:25) (58 - 92)  BP: 91/58 (08 Aug 2023 13:25) (79/56 - 167/98)  BP(mean): 70 (08 Aug 2023 13:25) (62 - 112)  RR: 15 (08 Aug 2023 12:00) (10 - 20)  SpO2: 95% (08 Aug 2023 13:25) (91% - 99%)    Parameters below as of 08 Aug 2023 12:00  Patient On (Oxygen Delivery Method): room air

## 2023-08-08 NOTE — BH CONSULTATION LIAISON ASSESSMENT NOTE - CURRENT MEDICATION
MEDICATIONS  (STANDING):  acetaminophen     Tablet .. 650 milliGRAM(s) Oral every 6 hours  buPROPion XL (24-Hour) . 150 milliGRAM(s) Oral daily  enoxaparin Injectable 40 milliGRAM(s) SubCutaneous every 24 hours  gabapentin 300 milliGRAM(s) Oral at bedtime  traZODone 300 milliGRAM(s) Oral at bedtime    MEDICATIONS  (PRN):  aluminum hydroxide/magnesium hydroxide/simethicone Suspension 30 milliLiter(s) Oral every 4 hours PRN Dyspepsia  melatonin 3 milliGRAM(s) Oral at bedtime PRN Insomnia  ondansetron Injectable 4 milliGRAM(s) IV Push every 8 hours PRN Nausea and/or Vomiting  
MEDICATIONS  (STANDING):  acetaminophen     Tablet .. 650 milliGRAM(s) Oral every 6 hours  enoxaparin Injectable 40 milliGRAM(s) SubCutaneous every 24 hours  gabapentin 300 milliGRAM(s) Oral at bedtime    MEDICATIONS  (PRN):  aluminum hydroxide/magnesium hydroxide/simethicone Suspension 30 milliLiter(s) Oral every 4 hours PRN Dyspepsia  melatonin 3 milliGRAM(s) Oral at bedtime PRN Insomnia  ondansetron Injectable 4 milliGRAM(s) IV Push every 8 hours PRN Nausea and/or Vomiting

## 2023-08-08 NOTE — PROGRESS NOTE ADULT - PROBLEM SELECTOR PLAN 1
#Stressed induced Cardiomyopathy #ST Depressions/Elevations  Pt presents to [facility] ED 8/7/23 with episode of sudden onset of nonexertional SSCP with radiation to neck/LUE and back, 7/10 in severity. s/p 1x ASA 324mg PO x 1 dose and NTG spray by EMS  EKG revealed NSR@62BPM with ST depressions in Leads II, III, AVF, w/ elevated Trop T 177 c/f NSTEMI  In ED, given SL NTG 0.4mg x 1 dose, Morphine 4mg IV x 1 dose, loaded with Plavix 600mg PO x 1 dose and started on a Heparin gtt per ACS protocol  Cardiac cath (8/7) showed no obstructive coronary disease. EDP 32, EF 30%. Consistent with stressed induced Cardiomyopathy.   Cardiac enzymes likely elevated d/t cardiac cath procedure.   Plan:   - Stop trending cardiac enzymes   - F/u TTE Complete  - Pain mgmt: Standing acetaminophen for pain  - Consider starting metoprolol, and GDMT once blood pressure improves.  - Consider starting statin once discharged.

## 2023-08-08 NOTE — BH CONSULTATION LIAISON ASSESSMENT NOTE - NS ED [facility] MED ROS PSYCHIATRIC
[Appropriately responsive] : appropriately responsive
[Alert] : alert
[No Acute Distress] : no acute distress
[No Lymphadenopathy] : no lymphadenopathy
[Regular Rate Rhythm] : regular rate rhythm
[No Murmurs] : no murmurs
See HPI
[Clear to Auscultation B/L] : clear to auscultation bilaterally
[Soft] : soft
[Non-tender] : non-tender
[Non-distended] : non-distended
See HPI
[No HSM] : No HSM
[No Lesions] : no lesions
[No Mass] : no mass
[Oriented x3] : oriented x3
[Examination Of The Breasts] : a normal appearance
[No Masses] : no breast masses were palpable
[Labia Majora] : normal
[Labia Minora] : normal
[Normal] : normal
[Uterine Adnexae] : normal

## 2023-08-08 NOTE — BH CONSULTATION LIAISON ASSESSMENT NOTE - NSBHSABEN_PSY_A_CORE FT
Difficult to gauge frequency of benzo use, but apparently sporadically has been taking Klonopin since the 1990's  Difficult to gauge frequency of benzo use, but apparently sporadically has been taking Klonopin since the 1990's -- reports it has always been prescribed. States she hasn't been taking recently due to concerns about it being addictive.

## 2023-08-09 ENCOUNTER — TRANSCRIPTION ENCOUNTER (OUTPATIENT)
Age: 78
End: 2023-08-09

## 2023-08-09 LAB
ALBUMIN SERPL ELPH-MCNC: 3.5 G/DL — SIGNIFICANT CHANGE UP (ref 3.3–5)
ALP SERPL-CCNC: 73 U/L — SIGNIFICANT CHANGE UP (ref 40–120)
ALT FLD-CCNC: 14 U/L — SIGNIFICANT CHANGE UP (ref 10–45)
ANION GAP SERPL CALC-SCNC: 11 MMOL/L — SIGNIFICANT CHANGE UP (ref 5–17)
ANISOCYTOSIS BLD QL: SLIGHT — SIGNIFICANT CHANGE UP
AST SERPL-CCNC: 28 U/L — SIGNIFICANT CHANGE UP (ref 10–40)
BASOPHILS # BLD AUTO: 0 K/UL — SIGNIFICANT CHANGE UP (ref 0–0.2)
BASOPHILS NFR BLD AUTO: 0 % — SIGNIFICANT CHANGE UP (ref 0–2)
BILIRUB SERPL-MCNC: 0.2 MG/DL — SIGNIFICANT CHANGE UP (ref 0.2–1.2)
BLD GP AB SCN SERPL QL: NEGATIVE — SIGNIFICANT CHANGE UP
BUN SERPL-MCNC: 14 MG/DL — SIGNIFICANT CHANGE UP (ref 7–23)
BURR CELLS BLD QL SMEAR: PRESENT — SIGNIFICANT CHANGE UP
CALCIUM SERPL-MCNC: 9.4 MG/DL — SIGNIFICANT CHANGE UP (ref 8.4–10.5)
CHLORIDE SERPL-SCNC: 104 MMOL/L — SIGNIFICANT CHANGE UP (ref 96–108)
CO2 SERPL-SCNC: 20 MMOL/L — LOW (ref 22–31)
CREAT SERPL-MCNC: 0.74 MG/DL — SIGNIFICANT CHANGE UP (ref 0.5–1.3)
EGFR: 84 ML/MIN/1.73M2 — SIGNIFICANT CHANGE UP
ELLIPTOCYTES BLD QL SMEAR: SLIGHT — SIGNIFICANT CHANGE UP
EOSINOPHIL # BLD AUTO: 0.24 K/UL — SIGNIFICANT CHANGE UP (ref 0–0.5)
EOSINOPHIL NFR BLD AUTO: 2.7 % — SIGNIFICANT CHANGE UP (ref 0–6)
GIANT PLATELETS BLD QL SMEAR: PRESENT — SIGNIFICANT CHANGE UP
GLUCOSE SERPL-MCNC: 97 MG/DL — SIGNIFICANT CHANGE UP (ref 70–99)
HCT VFR BLD CALC: 42.2 % — SIGNIFICANT CHANGE UP (ref 34.5–45)
HGB BLD-MCNC: 14 G/DL — SIGNIFICANT CHANGE UP (ref 11.5–15.5)
INR BLD: 0.95 — SIGNIFICANT CHANGE UP (ref 0.85–1.18)
LYMPHOCYTES # BLD AUTO: 4.19 K/UL — HIGH (ref 1–3.3)
LYMPHOCYTES # BLD AUTO: 46.4 % — HIGH (ref 13–44)
MACROCYTES BLD QL: SLIGHT — SIGNIFICANT CHANGE UP
MAGNESIUM SERPL-MCNC: 2.3 MG/DL — SIGNIFICANT CHANGE UP (ref 1.6–2.6)
MANUAL SMEAR VERIFICATION: SIGNIFICANT CHANGE UP
MCHC RBC-ENTMCNC: 30.8 PG — SIGNIFICANT CHANGE UP (ref 27–34)
MCHC RBC-ENTMCNC: 33.2 GM/DL — SIGNIFICANT CHANGE UP (ref 32–36)
MCV RBC AUTO: 92.7 FL — SIGNIFICANT CHANGE UP (ref 80–100)
MONOCYTES # BLD AUTO: 0.49 K/UL — SIGNIFICANT CHANGE UP (ref 0–0.9)
MONOCYTES NFR BLD AUTO: 5.4 % — SIGNIFICANT CHANGE UP (ref 2–14)
NEUTROPHILS # BLD AUTO: 4.11 K/UL — SIGNIFICANT CHANGE UP (ref 1.8–7.4)
NEUTROPHILS NFR BLD AUTO: 45.5 % — SIGNIFICANT CHANGE UP (ref 43–77)
OVALOCYTES BLD QL SMEAR: SLIGHT — SIGNIFICANT CHANGE UP
PHOSPHATE SERPL-MCNC: 2.9 MG/DL — SIGNIFICANT CHANGE UP (ref 2.5–4.5)
PLAT MORPH BLD: ABNORMAL
PLATELET # BLD AUTO: 330 K/UL — SIGNIFICANT CHANGE UP (ref 150–400)
POIKILOCYTOSIS BLD QL AUTO: SLIGHT — SIGNIFICANT CHANGE UP
POTASSIUM SERPL-MCNC: 4.1 MMOL/L — SIGNIFICANT CHANGE UP (ref 3.5–5.3)
POTASSIUM SERPL-SCNC: 4.1 MMOL/L — SIGNIFICANT CHANGE UP (ref 3.5–5.3)
PROT SERPL-MCNC: 7 G/DL — SIGNIFICANT CHANGE UP (ref 6–8.3)
PROTHROM AB SERPL-ACNC: 10.8 SEC — SIGNIFICANT CHANGE UP (ref 9.5–13)
RBC # BLD: 4.55 M/UL — SIGNIFICANT CHANGE UP (ref 3.8–5.2)
RBC # FLD: 13.6 % — SIGNIFICANT CHANGE UP (ref 10.3–14.5)
RBC BLD AUTO: ABNORMAL
RH IG SCN BLD-IMP: POSITIVE — SIGNIFICANT CHANGE UP
SMUDGE CELLS # BLD: PRESENT — SIGNIFICANT CHANGE UP
SODIUM SERPL-SCNC: 135 MMOL/L — SIGNIFICANT CHANGE UP (ref 135–145)
WBC # BLD: 9.03 K/UL — SIGNIFICANT CHANGE UP (ref 3.8–10.5)
WBC # FLD AUTO: 9.03 K/UL — SIGNIFICANT CHANGE UP (ref 3.8–10.5)

## 2023-08-09 PROCEDURE — 99233 SBSQ HOSP IP/OBS HIGH 50: CPT

## 2023-08-09 RX ORDER — METOPROLOL TARTRATE 50 MG
12.5 TABLET ORAL
Refills: 0 | Status: DISCONTINUED | OUTPATIENT
Start: 2023-08-09 | End: 2023-08-10

## 2023-08-09 RX ORDER — METOPROLOL TARTRATE 50 MG
12.5 TABLET ORAL
Refills: 0 | Status: DISCONTINUED | OUTPATIENT
Start: 2023-08-09 | End: 2023-08-09

## 2023-08-09 RX ORDER — SACUBITRIL AND VALSARTAN 24; 26 MG/1; MG/1
1 TABLET, FILM COATED ORAL
Qty: 30 | Refills: 5
Start: 2023-08-09

## 2023-08-09 RX ORDER — VALSARTAN 80 MG/1
20 TABLET ORAL EVERY 12 HOURS
Refills: 0 | Status: DISCONTINUED | OUTPATIENT
Start: 2023-08-09 | End: 2023-08-10

## 2023-08-09 RX ORDER — TRAZODONE HCL 50 MG
50 TABLET ORAL ONCE
Refills: 0 | Status: COMPLETED | OUTPATIENT
Start: 2023-08-09 | End: 2023-08-10

## 2023-08-09 RX ORDER — ATORVASTATIN CALCIUM 80 MG/1
40 TABLET, FILM COATED ORAL AT BEDTIME
Refills: 0 | Status: DISCONTINUED | OUTPATIENT
Start: 2023-08-09 | End: 2023-08-10

## 2023-08-09 RX ADMIN — Medication 12.5 MILLIGRAM(S): at 13:16

## 2023-08-09 RX ADMIN — ATORVASTATIN CALCIUM 40 MILLIGRAM(S): 80 TABLET, FILM COATED ORAL at 21:43

## 2023-08-09 RX ADMIN — Medication 650 MILLIGRAM(S): at 21:43

## 2023-08-09 RX ADMIN — Medication 650 MILLIGRAM(S): at 15:55

## 2023-08-09 RX ADMIN — GABAPENTIN 300 MILLIGRAM(S): 400 CAPSULE ORAL at 21:43

## 2023-08-09 RX ADMIN — Medication 650 MILLIGRAM(S): at 22:43

## 2023-08-09 RX ADMIN — Medication 12.5 MILLIGRAM(S): at 21:44

## 2023-08-09 RX ADMIN — Medication 650 MILLIGRAM(S): at 10:05

## 2023-08-09 RX ADMIN — Medication 650 MILLIGRAM(S): at 10:04

## 2023-08-09 RX ADMIN — VALSARTAN 20 MILLIGRAM(S): 80 TABLET ORAL at 18:12

## 2023-08-09 RX ADMIN — ENOXAPARIN SODIUM 40 MILLIGRAM(S): 100 INJECTION SUBCUTANEOUS at 05:37

## 2023-08-09 RX ADMIN — BUPROPION HYDROCHLORIDE 150 MILLIGRAM(S): 150 TABLET, EXTENDED RELEASE ORAL at 13:15

## 2023-08-09 NOTE — DISCHARGE NOTE PROVIDER - NSDCCPCAREPLAN_GEN_ALL_CORE_FT
PRINCIPAL DISCHARGE DIAGNOSIS  Diagnosis: Cardiomyopathy  Assessment and Plan of Treatment: You were diagnosed with stress-induced cardiomyopathy, a rare phenomenon in which your heart suddenly becomes weak. You underwent a cardiac catherterization which did not show any blockages in your vessels. This weakness or cardiomyopathy of your heart should recover over time, but you will need close follow-up with your cardiologist. You will also be slowly uptitrated on medications that help your heart function during this time.  - Please*****      SECONDARY DISCHARGE DIAGNOSES  Diagnosis: Major depression  Assessment and Plan of Treatment: During your hospital stay, we noted variable blood pressures. Your medications of klonopin, buproprion, and trazodone can increase your risk of developing low blood pressures, which we saw. Due to this concern + your high doses of these medications, you were evaluated by our psychiatrists who recommended we change some of your medications.   - Please continue take buproprion 150mg XL once a day  - Please continue taking trazodone 50mg once a day at night for insomnia  - STOP taking klonopin  - If you have continued insomnia, you may take one additional dose of trazodone 50mg.  - Please see an outpatient psychiatrist to continue discussiong your medications.

## 2023-08-09 NOTE — PROGRESS NOTE ADULT - PROBLEM SELECTOR PLAN 4
Likely 2/2 to atelectasis from being in bed.  No signs of infections (No leukocytosis, and afebrile), no cough, or SOB  PLAN:  - Start incentive spirometry
Likely 2/2 to atelectasis from being in bed.  No signs of infections (No leukocytosis, and afebrile), no cough, or SOB    Plan:  - ICS

## 2023-08-09 NOTE — DISCHARGE NOTE PROVIDER - NSDCFUADDAPPT_GEN_ALL_CORE_FT
Please call the following number to make an outpatient appointment to discuss your depression management and treatment.  [facility]  o	Telehealth visits  o	[address] , [place] [address]  o	[phone]  o	Medicare, Medicaid, most private insurances   You were provided a list of numbers to call to make an outpatient psychiatry appointment. Any provider can see you. If you would like to follow-up at [facility], you may call the following number to make an outpatient appointment to discuss your depression management and treatment.  [facility]  o	Telehealth visits  o	[address] [place] [address]  o	[phone]  o	Medicare, Medicaid, most private insurances

## 2023-08-09 NOTE — DISCHARGE NOTE PROVIDER - NSDCFUSCHEDAPPT_GEN_ALL_CORE_FT
Yany Suero  [facility] Physician [facility]  [facility] [address]  Scheduled Appointment: 08/18/2023

## 2023-08-09 NOTE — DISCHARGE NOTE PROVIDER - CARE PROVIDERS DIRECT ADDRESSES
,DirectAddress_Unknown ,[email or web address]

## 2023-08-09 NOTE — DISCHARGE NOTE PROVIDER - NSDCMRMEDTOKEN_GEN_ALL_CORE_FT
buPROPion 100 mg oral tablet: 1 orally 3 times a day  clonazePAM 0.5 mg oral tablet: 1 orally 2 times a day  gabapentin 300 mg oral capsule: 1 cap(s) orally once a day (at bedtime)  hydrOXYzine hydrochloride 25 mg oral tablet: 1 tab(s) orally once a day  omeprazole 40 mg oral delayed release capsule: 1 cap(s) orally once a day  Percocet 10 mg-325 mg oral tablet: 1 orally once a day  QUEtiapine 50 mg oral tablet: 2 tab(s) orally once a day (at bedtime)  traZODone 300 mg oral tablet: 1 tab(s) orally once a day (at bedtime)  Welchol 625 mg oral tablet: 1 tab(s) orally 2 times a day   acetaminophen 325 mg oral tablet: 2 tab(s) orally every 6 hours  aluminum hydroxide-magnesium hydroxide 200 mg-200 mg/5 mL oral suspension: 30 milliliter(s) orally every 4 hours As needed Dyspepsia  atorvastatin 40 mg oral tablet: 1 tab(s) orally once a day  buPROPion 150 mg/24 hours (XL) oral tablet, extended release: 1 tab(s) orally once a day  gabapentin 300 mg oral capsule: 1 cap(s) orally once a day (at bedtime)  hydrOXYzine hydrochloride 25 mg oral tablet: 1 tab(s) orally once a day  melatonin 3 mg oral tablet: 1 tab(s) orally once a day (at bedtime) As needed Insomnia  metoprolol succinate 25 mg oral tablet, extended release: 1 tab(s) orally once a day  omeprazole 40 mg oral delayed release capsule: 1 cap(s) orally once a day  Percocet 10 mg-325 mg oral tablet: 1 orally once a day  QUEtiapine 50 mg oral tablet: 2 tab(s) orally once a day (at bedtime)  traZODone 50 mg oral tablet: 50 milligram(s) orally once a day (at bedtime)  valsartan 40 mg oral tablet: 0.5 tab(s) orally 2 times a day TAKE ONE HALF OF A PILL TWICE DAILY  Welchol 625 mg oral tablet: 1 tab(s) orally 2 times a day   acetaminophen 325 mg oral tablet: 2 tab(s) orally every 6 hours  aluminum hydroxide-magnesium hydroxide 200 mg-200 mg/5 mL oral suspension: 30 milliliter(s) orally every 4 hours As needed Dyspepsia  atorvastatin 40 mg oral tablet: 1 tab(s) orally once a day  buPROPion 150 mg/24 hours (XL) oral tablet, extended release: 1 tab(s) orally once a day  gabapentin 300 mg oral capsule: 1 cap(s) orally once a day (at bedtime)  hydrOXYzine hydrochloride 25 mg oral tablet: 1 tab(s) orally once a day  melatonin 3 mg oral tablet: 1 tab(s) orally once a day (at bedtime) As needed Insomnia  metoprolol succinate 25 mg oral tablet, extended release: 1 tab(s) orally once a day  omeprazole 40 mg oral delayed release capsule: 1 cap(s) orally once a day  Percocet 10 mg-325 mg oral tablet: 1 orally once a day  QUEtiapine 50 mg oral tablet: 2 tab(s) orally once a day (at bedtime)  Rolling Walker: Use Rolling Walker to Ambulate  traZODone 50 mg oral tablet: 50 milligram(s) orally once a day (at bedtime)  valsartan 40 mg oral tablet: 0.5 tab(s) orally 2 times a day TAKE ONE HALF OF A PILL TWICE DAILY  Welchol 625 mg oral tablet: 1 tab(s) orally 2 times a day

## 2023-08-09 NOTE — PROGRESS NOTE ADULT - PROBLEM SELECTOR PLAN 2
Hypotension likely 2/2 to opioid and klonopin. Pt mentating well, denying lightheadedness, dizziness, HA, CP, and SOB. Received a total of 10mg of IV morphine in 24hrs (4mg @7pm, 4mg @ 11pm, and 2mg @ 1am), and 1g of Klonopin  - Pressures improving after dc'ing psych medications    Plan:  - Behavioral health consulted for depression and polypharmacy, aprpeciate recs  - Hold opioids and klonopin  - For pain: standing tylenol, nightly gabapentin 300mg qd  - see below for medications
likely 2/2 to opioid and klonopin. Pt mentating well, denying lightheadedness, dizziness, HA, CP, and SOB.  Received a total of 10mg of IV morphine in 24hrs (4mg @7pm, 4mg @ 11pm, and 2mg @ 1am), and 1g of Klonopin  MAPs are 60-65.   Lactate negative at 1.1  PLAN:  - Hold opioids, klonopin, and trazadone.   - CTM w/ frequent BP checks.
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
## 2023-08-09 NOTE — DISCHARGE NOTE PROVIDER - PROVIDER TOKENS
PROVIDER:[TOKEN:[[id number]],FOLLOWUP:[1 week]] PROVIDER:[TOKEN:[[id number]],SCHEDULEDAPPT:[08/18/2023],SCHEDULEDAPPTTIME:[02:00 PM],ESTABLISHEDPATIENT:[T]] PROVIDER:[TOKEN:[[id number]],SCHEDULEDAPPT:[08/15/2023],SCHEDULEDAPPTTIME:[12:00 PM],ESTABLISHEDPATIENT:[T]]

## 2023-08-09 NOTE — PROGRESS NOTE ADULT - ASSESSMENT
Pt is a 77 yo F w/ PMHx depression, chronic back pain, w/o cardiac hx - p/w sudden onset of nonexertional substernal chest pain with radiation to neck/LUE and back, s/p cardiac cath found to have stress-induced cardiomyopathy, admitted for further monitoring.

## 2023-08-09 NOTE — PROGRESS NOTE ADULT - SUBJECTIVE AND OBJECTIVE BOX
OVERNIGHT EVENTS: Required additional trazodone 50 x1.  BPs /70s overnight, otherwise VSS.     SUBJECTIVE / INTERVAL HPI: Patient seen and examined at bedside. Anxious to go, has cat at home      PHYSICAL EXAM:    General: NAD, awake  HEENT: PERRL, anicteric sclera; MMM  Neck: supple  Cardiovascular: +S1/S2, RRR  Respiratory: CTA B/L; normal wob  Gastrointestinal: soft, NT/ND; +BSx4  Extremities: WWP; no edema, clubbing or cyanosis  Vascular: 2+ radial, DP/PT pulses B/L  Neurological: AAOx3; no focal deficits  Psychiatric: pleasant mood and affect  Dermatologic: no appreciable wounds or damage to the skin    VITAL SIGNS:  Vital Signs Last 24 Hrs  T(C): 36.8 (09 Aug 2023 09:08), Max: 37.3 (08 Aug 2023 22:13)  T(F): 98.2 (09 Aug 2023 09:08), Max: 99.1 (08 Aug 2023 22:13)  HR: 84 (09 Aug 2023 09:40) (68 - 92)  BP: 102/60 (09 Aug 2023 09:40) (81/58 - 114/71)  BP(mean): 76 (09 Aug 2023 09:40) (65 - 87)  RR: 20 (09 Aug 2023 09:40) (10 - 22)  SpO2: 95% (09 Aug 2023 09:40) (92% - 96%)    Parameters below as of 09 Aug 2023 09:40  Patient On (Oxygen Delivery Method): room air          MEDICATIONS:  MEDICATIONS  (STANDING):  acetaminophen     Tablet .. 650 milliGRAM(s) Oral every 6 hours  enoxaparin Injectable 40 milliGRAM(s) SubCutaneous every 24 hours  gabapentin 300 milliGRAM(s) Oral at bedtime  metoprolol tartrate 12.5 milliGRAM(s) Oral two times a day    MEDICATIONS  (PRN):  aluminum hydroxide/magnesium hydroxide/simethicone Suspension 30 milliLiter(s) Oral every 4 hours PRN Dyspepsia  melatonin 3 milliGRAM(s) Oral at bedtime PRN Insomnia  ondansetron Injectable 4 milliGRAM(s) IV Push every 8 hours PRN Nausea and/or Vomiting      ALLERGIES:  Allergies    No Known Allergies  Allergy Status Unknown    Intolerances        LABS:                        14.0   9.03  )-----------( 330      ( 09 Aug 2023 05:30 )             42.2     08-09    135  |  104  |  14  ----------------------------<  97  4.1   |  20<L>  |  0.74    Ca    9.4      09 Aug 2023 05:30  Phos  2.9     08-09  Mg     2.3     08-09    TPro  7.0  /  Alb  3.5  /  TBili  0.2  /  DBili  x   /  AST  28  /  ALT  14  /  AlkPhos  73  08-09    PT/INR - ( 09 Aug 2023 05:30 )   PT: 10.8 sec;   INR: 0.95          PTT - ( 07 Aug 2023 18:40 )  PTT:32.1 sec  Urinalysis Basic - ( 09 Aug 2023 05:30 )    Color: x / Appearance: x / SG: x / pH: x  Gluc: 97 mg/dL / Ketone: x  / Bili: x / Urobili: x   Blood: x / Protein: x / Nitrite: x   Leuk Esterase: x / RBC: x / WBC x   Sq Epi: x / Non Sq Epi: x / Bacteria: x      CAPILLARY BLOOD GLUCOSE          RADIOLOGY & ADDITIONAL TESTS: Reviewed.

## 2023-08-09 NOTE — DISCHARGE NOTE PROVIDER - ATTENDING DISCHARGE PHYSICAL EXAMINATION:
Patient seen and examined at bedside with son present. Feels significantly better. Has been able to tolerate Toprol and Valsartan 20 mg PO BID without hypotension. Evaluated by PT and recommended for home with Home PT with rolling walker to avoid falls. She will follow up with outpatient Cardiologist Dr Maria Alejandra Patrick within 1 week of DC ([phone]).

## 2023-08-09 NOTE — PROGRESS NOTE ADULT - PROBLEM SELECTOR PLAN 1
#Stressed induced Cardiomyopathy #ST Depressions/Elevations  Pt presents to [facility] ED 8/7/23 with episode of sudden onset of nonexertional SSCP with radiation to neck/LUE and back, 7/10 in severity. s/p 1x ASA 324mg PO x 1 dose and NTG spray by EMS  EKG revealed NSR@62BPM with ST depressions in Leads II, III, AVF, w/ elevated Trop T 177 c/f NSTEMI  In ED, given SL NTG 0.4mg x 1 dose, Morphine 4mg IV x 1 dose, loaded with Plavix 600mg PO x 1 dose and started on a Heparin gtt per ACS protocol  Cardiac cath (8/7) showed no obstructive coronary disease. EDP 32, EF 30%. Consistent with stressed induced Cardiomyopathy.   TTE with EF 35-40%, hypokinesis of spical segments.    Plan:   - No need to trend cardiac enzymes  - GDMT: start lopressor 12.5mg BID  - Consider starting statin once discharged #Stressed induced Cardiomyopathy #ST Depressions/Elevations  Pt presents to [facility] ED 8/7/23 with episode of sudden onset of nonexertional SSCP with radiation to neck/LUE and back, 7/10 in severity. s/p 1x ASA 324mg PO x 1 dose and NTG spray by EMS  EKG revealed NSR@62BPM with ST depressions in Leads II, III, AVF, w/ elevated Trop T 177 c/f NSTEMI  In ED, given SL NTG 0.4mg x 1 dose, Morphine 4mg IV x 1 dose, loaded with Plavix 600mg PO x 1 dose and started on a Heparin gtt per ACS protocol  - Cardiac cath (8/7) showed no obstructive coronary disease. EDP 32, EF 30%. Consistent with stressed induced cardiomyopathy.   - TTE with EF 35-40%, hypokinesis of spical segments.    Plan:   - No need to trend cardiac enzymes  - GDMT: start lopressor 12.5mg BID  - Consider statin given

## 2023-08-09 NOTE — PROGRESS NOTE ADULT - PROBLEM SELECTOR PLAN 5
Fluids: PO  Electrolytes:  Diet: Regular/DASH/CC/Renal/NPO  DVT PPx: lovenox 40  GI PPx:   Code: Full  Dispo: 5lach then home

## 2023-08-09 NOTE — DISCHARGE NOTE PROVIDER - CARE PROVIDER_API CALL
Naina Cordero  Internal Medicine  [address] [place] [id number]  Phone: [phone]  Fax: [phone]  Follow Up Time: 1 week   Yany Suero  Cardiology  [address] [address]  Phone: [phone]  Fax: [phone]  Established Patient  Scheduled Appointment: 08/18/2023 02:00 PM   ROSITA LAUREN  [address] [place] [id number]  Phone: [phone]  Fax: ([phone]  Established Patient  Scheduled Appointment: 08/15/2023 12:00 PM

## 2023-08-09 NOTE — DISCHARGE NOTE PROVIDER - NSDCCPTREATMENT_GEN_ALL_CORE_FT
PRINCIPAL PROCEDURE  Procedure: Left heart cardiac cath  Findings and Treatment: You underwent a cardiac catheterization on 8/6 which did NOT show any obstructed coronary vessels. Your procedure was done through your wrist/groin.  You may shower but Avoid tub baths, hot tubs or swimming for 5 days to prevent infection.  You do not need to keep this area covered. You should wait 3 days before returning to ordinary activities. Do not drive for 2 days. Do not lift more than 5 pounds with affected arm for 3 days or more than 10 pounds if groin access for 5 days.  If you develop any swelling, bleeding, hardening of the skin (hematoma formation), acute pain, numbness/tingling  in your arm/groin please contact your doctor immediately or call our 24/7 line: [phone] or go to nearest Emergency Room. Please return to the hospital/seek immediate medical attention if you have worsening symptoms of chest pain, shortness of breath.   Please follow up with your Cardiologist  ___in 1-2 weeks, call the office and make an appointment. All of your prescriptions have been sent electronically to your pharmacy.  Please continue a heart healthy Plant based Mediterranean Diet low in sodium, cholesterol, and fat.        SECONDARY PROCEDURE  Procedure: Transthoracic echocardiography (TTE)  Findings and Treatment: CONCLUSIONS:   1. Moderate left ventricular systolic dysfunction with hypokinesis of   the apical segments with relatively preserved basal wall segments.   2. Normal right ventricular size and systolic function.   3. Normal atria.   4. No significant valvular disease.   5. No pericardial effusion.       PRINCIPAL PROCEDURE  Procedure: Left heart cardiac cath  Findings and Treatment: You underwent a cardiac catheterization on 8/6 which did NOT show any obstructed coronary vessels. Your procedure was done through your wrist/groin.  You may shower but Avoid tub baths, hot tubs or swimming for 5 days to prevent infection.  You do not need to keep this area covered. You should wait 3 days before returning to ordinary activities. Do not drive for 2 days. Do not lift more than 5 pounds with affected arm for 3 days or more than 10 pounds if groin access for 5 days.  If you develop any swelling, bleeding, hardening of the skin (hematoma formation), acute pain, numbness/tingling  in your arm/groin please contact your doctor immediately or call our 24/7 line: [phone] or go to nearest Emergency Room. Please return to the hospital/seek immediate medical attention if you have worsening symptoms of chest pain, shortness of breath.   Please follow up with your Cardiologist Dr. Patrick on 8/15. All of your prescriptions have been sent electronically to your pharmacy.  Please continue a heart healthy Plant based Mediterranean Diet low in sodium, cholesterol, and fat.        SECONDARY PROCEDURE  Procedure: Transthoracic echocardiography (TTE)  Findings and Treatment: CONCLUSIONS:   1. Moderate left ventricular systolic dysfunction (EF 35-40%) with hypokinesis of   the apical segments with relatively preserved basal wall segments.   2. Normal right ventricular size and systolic function.   3. Normal atria.   4. No significant valvular disease.   5. No pericardial effusion.

## 2023-08-09 NOTE — DISCHARGE NOTE PROVIDER - HOSPITAL COURSE
#Discharge: do not delete    Danay Magaña is 79yo F with PMHx of Depression, severe insomnia, and chronic back pain, presents to [facility] ED 8/7/23 with episode of sudden onset nonexertional substernal chest pain w/ radiation to neck/LUE and back, 7/10 in severity s/p non-obstructive cardiac cath found to have stress-induced cardiomyopathy.    Hospital course (by problem):   #Cardiomyopathy. Cardiac cath with non-obstructive disease, EDP 32, EF 30% consistent with stress induced cardiomyopathy. TTE with EF 35-40%, moderate left ventricular systolic dysfunction with hypokinesis of   the apical segments with relatively preserved basal wall segments.  - ****GDMT???    #Depression. Seen by behavioral psych, recommended to change psychiatric medications.   - C/w buproprion 150mg XL qd, trazodone 50mg qHs  - If persistent insomnia, consider additional trazodone 50mg OR quetiapine 50mg PRN  - DC klonopin  - Discontinue home medications: clonazepam 0.5 BID, trazodone 300mg qhs, and buproprion IR 100mg TID  - Will need outpatient psychiatry follow-up    Patient was discharged to: home    New medications:   Changes to old medications:  Medications that were stopped:    Items to follow up as outpatient:  - cardiomyopathy, GDMT  - depression medications    Physical exam at the time of discharge:  General: in no acute distress, appears lethargic, nontoxic appearing, speaking in full sentences.   Eyes: PERRLA, EOMI intact bilaterally. Anicteric sclerae, moist conjunctivae  HENT: Moist mucous membranes  Neck: Trachea midline, supple  Lungs: B/L diffuse crackles at the lower bases. No wheezes, or rhonchi  Cardiovascular: RRR. No murmurs, rubs, or gallops  Abdomen: +BS, Soft, non-tender non-distended; No rebound or guarding  Extremities: Distal extremity pulses weak but equal. WWP, No clubbing, cyanosis or edema  Neurological: Alert and oriented x3  Skin: Rt groin: Incision site bandaged w/ dry blood w/o warmth, erythema or purulence. Warm and dry. No obvious rash      #Discharge: do not delete    Danay Magaña is 79yo F with PMHx of Depression, severe insomnia, and chronic back pain, presents to [facility] ED 8/7/23 with episode of sudden onset nonexertional substernal chest pain w/ radiation to neck/LUE and back, 7/10 in severity s/p non-obstructive cardiac cath found to have stress-induced cardiomyopathy.    Hospital course (by problem):   #Cardiomyopathy. Cardiac cath with non-obstructive disease, EDP 32, EF 30% consistent with stress induced cardiomyopathy. TTE with EF 35-40%, moderate left ventricular systolic dysfunction with hypokinesis of   the apical segments with relatively preserved basal wall segments.  - ****    #Depression. Seen by behavioral psych, recommended to change psychiatric medications.   - C/w buproprion 150mg XL qd, trazodone 50mg qHs  - If persistent insomnia, consider additional trazodone 50mg OR quetiapine 50mg PRN  - DC klonopin  - Discontinue home medications: clonazepam 0.5 BID, trazodone 300mg qhs, and buproprion IR 100mg TID  - Will need outpatient psychiatry follow-up    Patient was discharged to: home    New medications:   Changes to old medications:  Medications that were stopped:    Items to follow up as outpatient:  - cardiomyopathy, GDMT  - depression medications    Physical exam at the time of discharge:  General: in no acute distress, appears lethargic, nontoxic appearing, speaking in full sentences.   Eyes: PERRLA, EOMI intact bilaterally. Anicteric sclerae, moist conjunctivae  HENT: Moist mucous membranes  Neck: Trachea midline, supple  Lungs: B/L diffuse crackles at the lower bases. No wheezes, or rhonchi  Cardiovascular: RRR. No murmurs, rubs, or gallops  Abdomen: +BS, Soft, non-tender non-distended; No rebound or guarding  Extremities: Distal extremity pulses weak but equal. WWP, No clubbing, cyanosis or edema  Neurological: Alert and oriented x3  Skin: Rt groin: Incision site bandaged w/ dry blood w/o warmth, erythema or purulence. Warm and dry. No obvious rash      #Discharge: do not delete    Danay Magaña is 79yo F with PMHx of Depression, severe insomnia, and chronic back pain, presents to [facility] ED 8/7/23 with episode of sudden onset nonexertional substernal chest pain w/ radiation to neck/LUE and back, 7/10 in severity s/p non-obstructive cardiac cath found to have stress-induced cardiomyopathy.    Hospital course (by problem):   #Cardiomyopathy. Cardiac cath with non-obstructive disease, EDP 32, EF 30% consistent with stress induced cardiomyopathy. TTE with EF 35-40%, moderate left ventricular systolic dysfunction with hypokinesis of   the apical segments with relatively preserved basal wall segments.  - ****    #Depression. Seen by behavioral psych, recommended to change psychiatric medications.   - C/w buproprion 150mg XL qd, trazodone 50mg qHs  - If persistent insomnia, consider additional trazodone 50mg OR quetiapine 50mg PRN  - DC klonopin  - Discontinue home medications: clonazepam 0.5 BID, trazodone 300mg qhs, and buproprion IR 100mg TID  - Will need outpatient psychiatry follow-up    Patient was discharged to: home    New medications:   Changes to old medications:  Medications that were stopped:    Items to follow up as outpatient:  - cardiomyopathy, GDMT  - outpatient psychiatry follow-up   - weaning off opioids    Physical exam at the time of discharge:  General: in no acute distress, appears lethargic, nontoxic appearing, speaking in full sentences.   Eyes: PERRLA, EOMI intact bilaterally. Anicteric sclerae, moist conjunctivae  HENT: Moist mucous membranes  Neck: Trachea midline, supple  Lungs: B/L diffuse crackles at the lower bases. No wheezes, or rhonchi  Cardiovascular: RRR. No murmurs, rubs, or gallops  Abdomen: +BS, Soft, non-tender non-distended; No rebound or guarding  Extremities: Distal extremity pulses weak but equal. WWP, No clubbing, cyanosis or edema  Neurological: Alert and oriented x3  Skin: Rt groin: Incision site bandaged w/ dry blood w/o warmth, erythema or purulence. Warm and dry. 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    Hospital course (by problem):   #Cardiomyopathy. Cardiac cath with non-obstructive disease, EDP 32, EF 30% consistent with stress induced cardiomyopathy. TTE with EF 35-40%, moderate left ventricular systolic dysfunction with hypokinesis of   the apical segments with relatively preserved basal wall segments. Patient started on Toprol XL 25 mg BID, Valsartan 20 mg BID per GDMT. Started on Atorvastatin 40 mg QD  - ****    #Depression. Seen by behavioral psych, recommended to change psychiatric medications.   - C/w buproprion 150mg XL qd, trazodone 50mg qhs  - If persistent insomnia, consider additional trazodone 50mg OR quetiapine 50mg PRN  - DC klonopin  - Discontinue home medications: clonazepam 0.5 BID, trazodone 300mg qhs, and buproprion IR 100mg TID  - Will need outpatient psychiatry follow-up    Patient was discharged to: home    New medications: Toprol XL 25 mg BID, Valsartan 20 mg BID, Atorvastatin 40 mg QD  Changes to old medications: Bupropion changed dose to 150 mg QD from 100 mg TID. Trazodone 50 mg qhs from 300 mg qhs  Medications that were stopped: Clonazepam 0.5 BID    Items to follow up as outpatient:  - cardiomyopathy, GDMT  - outpatient psychiatry follow-up   - weaning off opioids    Physical exam at the time of discharge:  General: in no acute distress, appears lethargic, nontoxic appearing, speaking in full sentences.   Eyes: PERRLA, EOMI intact bilaterally. Anicteric sclerae, moist conjunctivae  HENT: Moist mucous membranes  Neck: Trachea midline, supple  Lungs: B/L diffuse crackles at the lower bases. No wheezes, or rhonchi  Cardiovascular: RRR. No murmurs, rubs, or gallops  Abdomen: +BS, Soft, non-tender non-distended; No rebound or guarding  Extremities: Distal extremity pulses weak but equal. WWP, No clubbing, cyanosis or edema  Neurological: Alert and oriented x3  Skin: Rt groin: Incision site bandaged w/ dry blood w/o warmth, erythema or purulence. Warm and dry. No obvious rash

## 2023-08-10 ENCOUNTER — TRANSCRIPTION ENCOUNTER (OUTPATIENT)
Age: 78
End: 2023-08-10

## 2023-08-10 VITALS — TEMPERATURE: 99 F

## 2023-08-10 PROCEDURE — C1887: CPT

## 2023-08-10 PROCEDURE — 83735 ASSAY OF MAGNESIUM: CPT

## 2023-08-10 PROCEDURE — C1769: CPT

## 2023-08-10 PROCEDURE — 83605 ASSAY OF LACTIC ACID: CPT

## 2023-08-10 PROCEDURE — 85347 COAGULATION TIME ACTIVATED: CPT

## 2023-08-10 PROCEDURE — 86900 BLOOD TYPING SEROLOGIC ABO: CPT

## 2023-08-10 PROCEDURE — 84100 ASSAY OF PHOSPHORUS: CPT

## 2023-08-10 PROCEDURE — 84443 ASSAY THYROID STIM HORMONE: CPT

## 2023-08-10 PROCEDURE — 82550 ASSAY OF CK (CPK): CPT

## 2023-08-10 PROCEDURE — 80048 BASIC METABOLIC PNL TOTAL CA: CPT

## 2023-08-10 PROCEDURE — 36415 COLL VENOUS BLD VENIPUNCTURE: CPT

## 2023-08-10 PROCEDURE — 86850 RBC ANTIBODY SCREEN: CPT

## 2023-08-10 PROCEDURE — 71045 X-RAY EXAM CHEST 1 VIEW: CPT

## 2023-08-10 PROCEDURE — 84484 ASSAY OF TROPONIN QUANT: CPT

## 2023-08-10 PROCEDURE — C1894: CPT

## 2023-08-10 PROCEDURE — 80053 COMPREHEN METABOLIC PANEL: CPT

## 2023-08-10 PROCEDURE — 80061 LIPID PANEL: CPT

## 2023-08-10 PROCEDURE — 93005 ELECTROCARDIOGRAM TRACING: CPT

## 2023-08-10 PROCEDURE — 83036 HEMOGLOBIN GLYCOSYLATED A1C: CPT

## 2023-08-10 PROCEDURE — 99285 EMERGENCY DEPT VISIT HI MDM: CPT

## 2023-08-10 PROCEDURE — 86803 HEPATITIS C AB TEST: CPT

## 2023-08-10 PROCEDURE — C1760: CPT

## 2023-08-10 PROCEDURE — 99239 HOSP IP/OBS DSCHRG MGMT >30: CPT

## 2023-08-10 PROCEDURE — 86901 BLOOD TYPING SEROLOGIC RH(D): CPT

## 2023-08-10 PROCEDURE — 85025 COMPLETE CBC W/AUTO DIFF WBC: CPT

## 2023-08-10 PROCEDURE — 85027 COMPLETE CBC AUTOMATED: CPT

## 2023-08-10 PROCEDURE — 82553 CREATINE MB FRACTION: CPT

## 2023-08-10 PROCEDURE — 97161 PT EVAL LOW COMPLEX 20 MIN: CPT

## 2023-08-10 PROCEDURE — 83880 ASSAY OF NATRIURETIC PEPTIDE: CPT

## 2023-08-10 PROCEDURE — 85730 THROMBOPLASTIN TIME PARTIAL: CPT

## 2023-08-10 PROCEDURE — C8929: CPT

## 2023-08-10 PROCEDURE — 85610 PROTHROMBIN TIME: CPT

## 2023-08-10 RX ORDER — ATORVASTATIN CALCIUM 80 MG/1
1 TABLET, FILM COATED ORAL
Qty: 30 | Refills: 2
Start: 2023-08-10 | End: 2023-11-07

## 2023-08-10 RX ORDER — CLONAZEPAM 1 MG
1 TABLET ORAL
Refills: 0 | DISCHARGE

## 2023-08-10 RX ORDER — METOPROLOL TARTRATE 50 MG
25 TABLET ORAL DAILY
Refills: 0 | Status: DISCONTINUED | OUTPATIENT
Start: 2023-08-10 | End: 2023-08-10

## 2023-08-10 RX ORDER — BUPROPION HYDROCHLORIDE 150 MG/1
1 TABLET, EXTENDED RELEASE ORAL
Qty: 30 | Refills: 1
Start: 2023-08-10

## 2023-08-10 RX ORDER — TRAZODONE HCL 50 MG
50 TABLET ORAL
Qty: 30 | Refills: 2
Start: 2023-08-10 | End: 2023-11-07

## 2023-08-10 RX ORDER — METOPROLOL TARTRATE 50 MG
1 TABLET ORAL
Qty: 30 | Refills: 2
Start: 2023-08-10 | End: 2023-11-07

## 2023-08-10 RX ORDER — TRAZODONE HCL 50 MG
1 TABLET ORAL
Refills: 0 | DISCHARGE

## 2023-08-10 RX ORDER — VALSARTAN 80 MG/1
0.5 TABLET ORAL
Qty: 30 | Refills: 2
Start: 2023-08-10 | End: 2023-11-07

## 2023-08-10 RX ORDER — LANOLIN ALCOHOL/MO/W.PET/CERES
1 CREAM (GRAM) TOPICAL
Qty: 0 | Refills: 0 | DISCHARGE
Start: 2023-08-10

## 2023-08-10 RX ORDER — BUPROPION HYDROCHLORIDE 150 MG/1
1 TABLET, EXTENDED RELEASE ORAL
Refills: 0 | DISCHARGE

## 2023-08-10 RX ORDER — ACETAMINOPHEN 500 MG
2 TABLET ORAL
Qty: 0 | Refills: 0 | DISCHARGE
Start: 2023-08-10

## 2023-08-10 RX ADMIN — Medication 25 MILLIGRAM(S): at 09:12

## 2023-08-10 RX ADMIN — Medication 50 MILLIGRAM(S): at 00:01

## 2023-08-10 RX ADMIN — ENOXAPARIN SODIUM 40 MILLIGRAM(S): 100 INJECTION SUBCUTANEOUS at 07:14

## 2023-08-10 RX ADMIN — VALSARTAN 20 MILLIGRAM(S): 80 TABLET ORAL at 05:59

## 2023-08-10 RX ADMIN — Medication 650 MILLIGRAM(S): at 09:15

## 2023-08-10 RX ADMIN — Medication 650 MILLIGRAM(S): at 08:04

## 2023-08-10 NOTE — PHYSICAL THERAPY INITIAL EVALUATION ADULT - PERTINENT HX OF CURRENT PROBLEM, REHAB EVAL
77yo F with PMHx of Depression, severe insomnia, and chronic back pain, presents to [facility] ED 8/7/23 with episode of sudden onset nonexertional substernal chest pain w/ radiation to neck/LUE and back, 7/10 in severity. Patient denies any associated N/V, diaphoresis, palpitations, dizziness, PND, orthopnea, LE edema, recent travel, sick contacts or recent cardiac work-up. Patient was given ASA 324mg PO x 1 dose and nitroglycerin spray by EMS enroute to ED.

## 2023-08-10 NOTE — PHYSICAL THERAPY INITIAL EVALUATION ADULT - GAIT DEVIATIONS NOTED, PT EVAL
after ~25' pt c/o dizziness and feeling as though her "arms and legs are giving way," returned to seated for ~2 minutes and then able to ambulate back to room with 1 standing rest break however c/o fatigue in arms / legs/decreased pawan/decreased velocity of limb motion/decreased step length/decreased weight-shifting ability

## 2023-08-10 NOTE — PHYSICAL THERAPY INITIAL EVALUATION ADULT - PHYSICAL ASSIST/NONPHYSICAL ASSIST, REHAB EVAL
Pt had a diflucan on Friday.  Pt has some white discharge.  Pt has less itching but not completely resolved.   Pt unsure if this is a yeast infection since she was placed on flagyl in July for bacterial infection.  Pt was on ABX last week for UTI.  Last day of ABX was Saturday and her symptoms came back Sunday afternoon/Monday am.  Pt is has frequency, itching, some burning with urination.  No fevers.  No back pain.  Please advise.   verbal cues/1 person assist

## 2023-08-10 NOTE — DISCHARGE NOTE NURSING/CASE MANAGEMENT/SOCIAL WORK - NSDCFUADDAPPT_GEN_ALL_CORE_FT
You were provided a list of numbers to call to make an outpatient psychiatry appointment. Any provider can see you. If you would like to follow-up at [facility], you may call the following number to make an outpatient appointment to discuss your depression management and treatment.  [facility]  o	Telehealth visits  o	[address] [place] [address]  o	[phone]  o	Medicare, Medicaid, most private insurances

## 2023-08-10 NOTE — DISCHARGE NOTE NURSING/CASE MANAGEMENT/SOCIAL WORK - PATIENT PORTAL LINK FT
You can access the FollowMyHealth Patient Portal offered by [facility] by registering at the following website: [email or web address]. By joining [facility]’s FollowMyHealth portal, you will also be able to view your health information using other applications (apps) compatible with our system.

## 2023-08-10 NOTE — PHYSICAL THERAPY INITIAL EVALUATION ADULT - ADDITIONAL COMMENTS
As per pt, PTA she was independent with all functional mobility, ADLs, and IADLs with no AD, however was home bound secondary to declining health and capability to perform functional mobility. Pt was only able to ambulate short distances in the home, did not leave her apartment and ordered in groceries. Pt has history of hip surgeries, has gotten rid of all assistive devices since. Has a shower tub. As per son Henry at bedside, pt has been struggling to care for self since decline in health.

## 2023-08-10 NOTE — PHYSICAL THERAPY INITIAL EVALUATION ADULT - NSPTDISCHREC_GEN_A_CORE
subacute rehab vs home with 24/7 family assist, outpatient PT, and rolling walker; discussed with benoit Figueroa at bedside, JOSEPHINE Cortés, & JENNIFER Hercules

## 2023-08-16 DIAGNOSIS — G47.00 INSOMNIA, UNSPECIFIED: ICD-10-CM

## 2023-08-16 DIAGNOSIS — F32.9 MAJOR DEPRESSIVE DISORDER, SINGLE EPISODE, UNSPECIFIED: ICD-10-CM

## 2023-08-16 DIAGNOSIS — G89.29 OTHER CHRONIC PAIN: ICD-10-CM

## 2023-08-16 DIAGNOSIS — J98.11 ATELECTASIS: ICD-10-CM

## 2023-08-16 DIAGNOSIS — I21.4 NON-ST ELEVATION (NSTEMI) MYOCARDIAL INFARCTION: ICD-10-CM

## 2023-08-16 DIAGNOSIS — M54.9 DORSALGIA, UNSPECIFIED: ICD-10-CM

## 2023-08-16 DIAGNOSIS — I50.21 ACUTE SYSTOLIC (CONGESTIVE) HEART FAILURE: ICD-10-CM

## 2023-08-16 DIAGNOSIS — I11.0 HYPERTENSIVE HEART DISEASE WITH HEART FAILURE: ICD-10-CM

## 2023-08-17 ENCOUNTER — TRANSCRIPTION ENCOUNTER (OUTPATIENT)
Age: 78
End: 2023-08-17

## 2023-08-18 ENCOUNTER — APPOINTMENT (OUTPATIENT)
Dept: HEART AND VASCULAR | Facility: CLINIC | Age: 78
End: 2023-08-18

## 2023-08-23 ENCOUNTER — TRANSCRIPTION ENCOUNTER (OUTPATIENT)
Age: 78
End: 2023-08-23

## 2023-09-08 ENCOUNTER — TRANSCRIPTION ENCOUNTER (OUTPATIENT)
Age: 78
End: 2023-09-08

## 2023-09-12 ENCOUNTER — TRANSCRIPTION ENCOUNTER (OUTPATIENT)
Age: 78
End: 2023-09-12

## 2023-11-15 NOTE — PROGRESS NOTE ADULT - ATTENDING COMMENTS
For information on Fall & Injury Prevention, visit: [email or web address] OR  [email or web address] OR  https://www.cdc.gov/steadi/patient.html
78F w/ pmh of anxiety/depression, multiple recent emotional stressors, p/w chest pain w/ initial concern for ACS s/p LHC w/ normal coronaries, found to have acute systolic CHF 2/2 stress induced CM    -CHF - acute systolic CHF 2/2 stress induced CM; s/p LHC normal coronaries; Currently w/ soft BPs but otherwise euvolemic on exam, WWP w/ no evidence of end organ dysfunction on labs, Lactate WNL; Pt. denies lightheadedness, SOB and states CP has improved. Hypotension likely related to newly reduced EF and significant amount of IV Morphine overnight ~10mg: Bedside TTE: LVEF ~20%, apical ballooning, no significant valvular disease; Plan to initiate GDMT when BP able to tolerate. Will watch off diuretics; No further opiates  -DASH diet  -OOB to chair  -DVT PPX  -Dispo: SD to Cardiac Tele  -Full Code    Melissa Nuñez MD  CCU Attending
Patient is a 77 yo F w/PMHx Depression, chronic back/Neck pain on Chronic Opiods, who presented to the ER with sudden onset of nonexertional substernal chest pain R/I for NSTEMI s/p Coronary Angiogram revealing NOCAD and suggestive of stress-induced cardiomyopathy, with hospital course notable for brief episodes of Asymptomatic Hypotension, now clinically improving     Physical Exam: Normal S1,S2, no M/R/G, Lungs CTABL, LE: Warm and well perfused    Review of Studies:  - Echo 08/08/23: Moderate left ventricular systolic dysfunction with hypokinesis of  the apical segments with relatively preserved basal wall segments. Normal right ventricular size and systolic function. No significant valvular disease. No pericardial effusion.    # NSTEMI  # Stress Induced Cardiomyopathy  # HFrEF    - Acute systolic Heart Failure/Stress induce Cardiomyopathy: Coronary angiogram showed NOCAD. LV gram suggestive of Stress induced CM. Echo with Moderately reduced systolic function. Clinically patient is warm and well perfused and BP's have improved. Recommend starting on Lopressor 12.5 mg PO BID with strict holding parameters with plan to swith to Toprol 25 mg po daily starting 8/10 if able to tolerate. If patient able to tolerate, would also initiate Valsartan 20 mg PO BID later today with strict holding parameters (Hold for SBP<100). Will consider Entresto as an outpatient if covered by insurance and if patient able to tolerate valsartan. No current need for diuresis at this time.  - NOCAD: Start Lipitor 40 mg po daily.  - Anxiety/Depress/Chronic Opoid use; Seen and evaluated by Psych. C/w wellbutrin 150mg XR daily; Trazodone 50mg on prn basis. No opioid or klonopin at this time  - Dispo: Recommend PT assessment today. Patient will need follow up with outpatient Cardiologist Dr Maria Alejandra Patrick within 1 week of DC ([phone]). Anticipate DC on 8/10 if able to tolerate GDMT within further hypotensive episodes

## 2024-11-04 NOTE — BH CONSULTATION LIAISON ASSESSMENT NOTE - NSBHCONSULTWORKUP_PSY_A_CORE
Pt fell about month ago and is looking for sooner [place] with dr Manrique ONLY.  Pt's left side still hurts a lot , pt refused to see any other physicians,  Pls call her back   no